# Patient Record
Sex: FEMALE | Race: WHITE | NOT HISPANIC OR LATINO | URBAN - METROPOLITAN AREA
[De-identification: names, ages, dates, MRNs, and addresses within clinical notes are randomized per-mention and may not be internally consistent; named-entity substitution may affect disease eponyms.]

---

## 2017-10-26 ENCOUNTER — APPOINTMENT (OUTPATIENT)
Dept: WOMENS IMAGING | Facility: HOSPITAL | Age: 52
End: 2017-10-26

## 2017-10-26 PROCEDURE — 77067 SCR MAMMO BI INCL CAD: CPT | Performed by: RADIOLOGY

## 2017-10-26 PROCEDURE — 77063 BREAST TOMOSYNTHESIS BI: CPT | Performed by: RADIOLOGY

## 2018-01-19 ENCOUNTER — HOSPITAL ENCOUNTER (OUTPATIENT)
Facility: HOSPITAL | Age: 53
Setting detail: HOSPITAL OUTPATIENT SURGERY
End: 2018-01-19
Attending: OBSTETRICS & GYNECOLOGY | Admitting: OBSTETRICS & GYNECOLOGY

## 2018-02-02 ENCOUNTER — APPOINTMENT (OUTPATIENT)
Dept: PREADMISSION TESTING | Facility: HOSPITAL | Age: 53
End: 2018-02-02

## 2019-01-21 ENCOUNTER — APPOINTMENT (OUTPATIENT)
Dept: WOMENS IMAGING | Facility: HOSPITAL | Age: 54
End: 2019-01-21

## 2019-01-21 PROCEDURE — 77067 SCR MAMMO BI INCL CAD: CPT | Performed by: RADIOLOGY

## 2019-01-21 PROCEDURE — 77063 BREAST TOMOSYNTHESIS BI: CPT | Performed by: RADIOLOGY

## 2019-08-14 ENCOUNTER — OFFICE VISIT (OUTPATIENT)
Dept: GASTROENTEROLOGY | Facility: CLINIC | Age: 54
End: 2019-08-14

## 2019-08-14 VITALS
WEIGHT: 149.2 LBS | SYSTOLIC BLOOD PRESSURE: 128 MMHG | DIASTOLIC BLOOD PRESSURE: 72 MMHG | HEIGHT: 62 IN | TEMPERATURE: 98.3 F | BODY MASS INDEX: 27.46 KG/M2

## 2019-08-14 DIAGNOSIS — R10.13 EPIGASTRIC PAIN: Primary | ICD-10-CM

## 2019-08-14 DIAGNOSIS — Z80.0 FH: COLON CANCER: ICD-10-CM

## 2019-08-14 PROCEDURE — 99203 OFFICE O/P NEW LOW 30 MIN: CPT | Performed by: INTERNAL MEDICINE

## 2019-08-14 NOTE — PROGRESS NOTES
Chief Complaint   Patient presents with   • FM HX colon cancer     Itzel Moses is a 53 y.o. female who presents with some vague discomfort in the right side that radiates to the back.  Intermittent discomfort - starts after eating. No nausea.  Weight has increased.  She takes probiotic and prebiotic. She thinks the probiotic helps.  Negative celiac testing 1 year ago.   Last c/s 4-5 years ago in [place] - no polyps.    Since her last visit her father has been diagnosed with CRC.  Mother had CRC with liver mets.      HPI  Past Medical History:   Diagnosis Date   • Allergic rhinitis    • Depression    • Family hx of colon cancer      Past Surgical History:   Procedure Laterality Date   • BREAST SURGERY     • COLONOSCOPY  approx 2015    normal per patient   • KNEE SURGERY     • OVARIAN CYST REMOVAL         Current Outpatient Medications:   •  cetirizine (zyrTEC) 10 MG tablet, Take 10 mg by mouth Daily., Disp: , Rfl:   •  Collagen 500 MG capsule, Take  by mouth., Disp: , Rfl:   •  PREBIOTIC PRODUCT PO, Take  by mouth., Disp: , Rfl:   •  Probiotic Product (PROBIOTIC PO), Take  by mouth., Disp: , Rfl:   •  sertraline (ZOLOFT) 50 MG tablet, Take 50 mg by mouth daily., Disp: , Rfl:   •  TURMERIC PO, Take  by mouth., Disp: , Rfl:   No Known Allergies  Social History     Socioeconomic History   • Marital status:      Spouse name: Not on file   • Number of children: Not on file   • Years of education: Not on file   • Highest education level: Not on file   Tobacco Use   • Smoking status: Former Smoker     Types: Cigarettes     Last attempt to quit: 2009     Years since quitting: 10.6   • Smokeless tobacco: Never Used   • Tobacco comment: social   Substance and Sexual Activity   • Alcohol use: Yes   • Drug use: No   • Sexual activity: Defer     Family History   Problem Relation Age of Onset   • Liver cancer Mother    • Colon cancer Mother    • Prostate cancer Father    • Colon cancer Father    • Heart attack Father     • Melanoma Father      Review of Systems   Constitutional: Negative for appetite change and unexpected weight change.   Gastrointestinal: Positive for abdominal pain. Negative for blood in stool, constipation, diarrhea and nausea.   Musculoskeletal: Positive for back pain.   All other systems reviewed and are negative.    Vitals:    08/14/19 1312   BP: 128/72   Temp: 98.3 °F (36.8 °C)         08/14/19  1312   Weight: 67.7 kg (149 lb 3.2 oz)     Physical Exam   Constitutional: She appears well-developed and well-nourished.   HENT:   Head: Normocephalic and atraumatic.   Eyes: No scleral icterus.   Abdominal: Soft. She exhibits no distension and no mass. There is tenderness.       Neurological: She is alert.   Skin: Skin is warm and dry.   Psychiatric: She has a normal mood and affect.     No images are attached to the encounter.  No notes on file  Itzel was seen today for fm hx colon cancer.    Diagnoses and all orders for this visit:    Epigastric pain  -     Case Request; Standing  -     Case Request    FH: colon cancer  -     Case Request; Standing  -     Case Request    Other orders  -     Follow Anesthesia Guidelines / Standing Orders; Future  -     Obtain Informed Consent; Future  -     Implement Anesthesia orders day of procedure.; Standing  -     Obtain informed consent; Standing  -     Verify bowel prep was successful; Standing  -     Give tap water enema if bowel prep was insufficient; Standing    Plan:  - she is due for colonoscopy - given symptoms, would recommend egd to be performed with c/s for further eval  - d/c prebiotic - ok to continue probiotic as she feels like this has helped

## 2019-10-25 ENCOUNTER — OUTSIDE FACILITY SERVICE (OUTPATIENT)
Dept: GASTROENTEROLOGY | Facility: CLINIC | Age: 54
End: 2019-10-25

## 2019-10-25 PROCEDURE — 45380 COLONOSCOPY AND BIOPSY: CPT | Performed by: INTERNAL MEDICINE

## 2019-10-25 PROCEDURE — 43239 EGD BIOPSY SINGLE/MULTIPLE: CPT | Performed by: INTERNAL MEDICINE

## 2019-11-05 ENCOUNTER — TELEPHONE (OUTPATIENT)
Dept: GASTROENTEROLOGY | Facility: CLINIC | Age: 54
End: 2019-11-05

## 2019-11-05 NOTE — TELEPHONE ENCOUNTER
Mild inflammation seen on gastric and duodenal bx.  Otherwise all biopsies normal.  rec omeprazole 20 mg bid x 6 weeks, avoid nsaids.    Repeat c/s 5 years    Office f/u with NP 6 weeks

## 2019-11-07 NOTE — TELEPHONE ENCOUNTER
Called pt and advised per Dr Duque that she had mild inflammation seen on gastric and duodenal bx .  Otherwise all bx normal.  She recommends omeprazole 20mg po bid for 6 wks , avoid nsaids.      Also repeat c/s in 5 yrs and f/u with NP in 6 wks. Pt verb understanding and made appt for 01/06 at 11a with Katerin ACEVES

## 2020-01-06 ENCOUNTER — TELEPHONE (OUTPATIENT)
Dept: GASTROENTEROLOGY | Facility: CLINIC | Age: 55
End: 2020-01-06

## 2020-01-06 ENCOUNTER — OFFICE VISIT (OUTPATIENT)
Dept: GASTROENTEROLOGY | Facility: CLINIC | Age: 55
End: 2020-01-06

## 2020-01-06 VITALS
WEIGHT: 145 LBS | TEMPERATURE: 98 F | BODY MASS INDEX: 26.68 KG/M2 | DIASTOLIC BLOOD PRESSURE: 76 MMHG | SYSTOLIC BLOOD PRESSURE: 112 MMHG | HEIGHT: 62 IN

## 2020-01-06 DIAGNOSIS — K29.80 DUODENITIS: ICD-10-CM

## 2020-01-06 DIAGNOSIS — K21.00 GASTROESOPHAGEAL REFLUX DISEASE WITH ESOPHAGITIS: ICD-10-CM

## 2020-01-06 DIAGNOSIS — K29.60 EROSIVE GASTRITIS: Primary | ICD-10-CM

## 2020-01-06 DIAGNOSIS — Z80.0 FH: COLON CANCER: ICD-10-CM

## 2020-01-06 PROCEDURE — 99214 OFFICE O/P EST MOD 30 MIN: CPT | Performed by: NURSE PRACTITIONER

## 2020-01-06 RX ORDER — TOPIRAMATE 25 MG/1
75 TABLET ORAL DAILY
COMMUNITY
Start: 2019-12-03

## 2020-01-06 RX ORDER — OMEPRAZOLE 20 MG/1
20 TABLET, DELAYED RELEASE ORAL 2 TIMES DAILY
Qty: 60 TABLET | Refills: 3 | Status: SHIPPED | OUTPATIENT
Start: 2020-01-06 | End: 2020-05-04

## 2020-01-06 RX ORDER — CHLORAL HYDRATE 500 MG
1000 CAPSULE ORAL
COMMUNITY

## 2020-01-06 NOTE — TELEPHONE ENCOUNTER
----- Message from Denise Russo sent at 1/6/2020  2:26 PM EST -----  Regarding: Meds   Contact: [phone]  Pt is calling regarding insurance is not covering Omeprazole . Pt is asking to change it to Pantropazole

## 2020-01-06 NOTE — TELEPHONE ENCOUNTER
Called pt and pt states her pharmacy advised her that her insurance will not cover for omeprazole 20mg bid but will cover for omeprazole 40mg daily.  She also state she thinks that her insurance will cover for pantoprazole bid.  Advised pt will call her pharmacy and see what they say.  Pt verb understanding.     Called [facility] and spoke with pharmacist Gwen who advised they will cover for once a day dosing either omeprazole 20mg once a day or 40mg once a day.  Also will cover pantoprazole once a day. Advised will send message to Katerin ACEVES.  She verb understanding.

## 2020-01-06 NOTE — PROGRESS NOTES
Chief Complaint   Patient presents with   • Follow-up     post scopes   • Abdominal Pain       Itzel Moses is a  54 y.o. female here for a follow up visit for GERD.    HPI  54-year-old female presents today for follow-up visit for GERD with upper abdominal pain.  She is a patient of Dr. Duque.  She recently underwent EGD and colonoscopy on 10/25/2019.  EGD showed erosive esophagitis with gastritis and duodenitis.  Colonoscopy showed diverticulosis with some nonbleeding internal hemorrhoids.  Path was negative.  She was supposed to take omeprazole 20 mg twice daily for 6 weeks and then taper off.  She tells me she does not like taking pills and she definitely does not want take a PPI because she is read all potential side effects.  And so she did not take anything.  She does me she does drink a lot of alcohol and caffeine.  She is recently switched from wine to vodka due to the wine causing her migraines to be worse.  She tells me she is under a lot of stress with caregiving for her elderly father in her home.  Both her  and her son are on reflux medicine and she hates that they are both on it.  She tells me she has been trying to eat better and do more natural remedies like probiotics.  She tells me she is never really had any reflux symptoms other than the upper abdominal pain.  She does have a family history of colon cancer.  She is in recall for another colonoscopy in 5 years.  She denies any dysphagia, reflux, nausea and vomiting, diarrhea, constipation, rectal bleeding or melena.  She was appetite is good and her weight is stable.  Past Medical History:   Diagnosis Date   • Allergic rhinitis    • Depression    • Family hx of colon cancer    • Migraines        Past Surgical History:   Procedure Laterality Date   • BREAST SURGERY     • COLONOSCOPY  approx 2015    normal per patient   • COLONOSCOPY  10/25/2019    diverticulosis, NBIH, atrophic and granular mucosa in terminal ileum   • ENDOSCOPY   10/25/2019    erosive esophagitis, duodenitis   • KNEE SURGERY     • OVARIAN CYST REMOVAL         Scheduled Meds:    Continuous Infusions:  No current facility-administered medications for this visit.     PRN Meds:.    No Known Allergies    Social History     Socioeconomic History   • Marital status:      Spouse name: Not on file   • Number of children: Not on file   • Years of education: Not on file   • Highest education level: Not on file   Tobacco Use   • Smoking status: Former Smoker     Types: Cigarettes     Last attempt to quit: 2009     Years since quittin.0   • Smokeless tobacco: Never Used   • Tobacco comment: social   Substance and Sexual Activity   • Alcohol use: Yes   • Drug use: No   • Sexual activity: Defer       Family History   Problem Relation Age of Onset   • Liver cancer Mother    • Colon cancer Mother    • Prostate cancer Father    • Colon cancer Father    • Heart attack Father    • Melanoma Father        Review of Systems   Constitutional: Negative for appetite change, chills, diaphoresis, fatigue, fever and unexpected weight change.   HENT: Negative for nosebleeds, postnasal drip, sore throat, trouble swallowing and voice change.    Respiratory: Negative for cough, choking, chest tightness, shortness of breath and wheezing.    Cardiovascular: Negative for chest pain, palpitations and leg swelling.   Gastrointestinal: Positive for abdominal distention. Negative for abdominal pain, anal bleeding, blood in stool, constipation, diarrhea, nausea, rectal pain and vomiting.   Endocrine: Negative for polydipsia, polyphagia and polyuria.   Musculoskeletal: Negative for gait problem.   Skin: Negative for rash and wound.   Allergic/Immunologic: Negative for food allergies.   Neurological: Negative for dizziness, speech difficulty and light-headedness.   Psychiatric/Behavioral: Negative for confusion, self-injury, sleep disturbance and suicidal ideas.       Vitals:    20 1059   BP: 112/76    Temp: 98 °F (36.7 °C)       Physical Exam   Constitutional: She is oriented to person, place, and time. She appears well-developed and well-nourished. She does not appear ill. No distress.   HENT:   Head: Normocephalic.   Eyes: Pupils are equal, round, and reactive to light.   Cardiovascular: Normal rate, regular rhythm and normal heart sounds.   Pulmonary/Chest: Effort normal and breath sounds normal.   Abdominal: Soft. Bowel sounds are normal. She exhibits distension. She exhibits no mass. There is no hepatosplenomegaly. There is no tenderness. There is no rebound and no guarding. No hernia.   Musculoskeletal: Normal range of motion.   Neurological: She is alert and oriented to person, place, and time.   Skin: Skin is warm and dry.   Psychiatric: She has a normal mood and affect. Her speech is normal and behavior is normal. Judgment normal.       No images are attached to the encounter.     1. Erosive gastritis    2. Gastroesophageal reflux disease with esophagitis    3. Duodenitis    4. FH: colon cancer    Reviewed scope results with her today.  She never did take the omeprazole as directed after her EGD.  Had a long discussion with her today about the risk of esophageal cancer and letting reflux go untreated.  She is agreed to get on omeprazole 20 mg twice daily x6 weeks.  At that time we can transfer her over hopefully to Pepcid or some other H2 blocker.  Long discussion with her about GERD triggers.  She is going to work on her alcohol intake as well as her caffeine use.  Patient is under a lot of stress at home with caretaking for her elderly father.  Patient to call the office with any issues.  Patient to continue GERD precautions.  Patient to follow-up with me or Dr. Duque in 3 months.  Recall colonoscopy in 5 years.

## 2020-01-07 RX ORDER — PANTOPRAZOLE SODIUM 40 MG/1
40 TABLET, DELAYED RELEASE ORAL DAILY
Qty: 30 TABLET | Refills: 3 | Status: SHIPPED | OUTPATIENT
Start: 2020-01-07 | End: 2020-04-07

## 2020-01-07 NOTE — TELEPHONE ENCOUNTER
Called pt and advised per Katerin NP that she is going to switch her to pantoprazole 40mg po daily and we will see how she does on that.  It is stronger than omeprazole.  Advised we will escribe this to her [facility] pharmacy. Pt verb understanding.

## 2020-01-22 ENCOUNTER — APPOINTMENT (OUTPATIENT)
Dept: WOMENS IMAGING | Facility: HOSPITAL | Age: 55
End: 2020-01-22

## 2020-01-22 PROCEDURE — 77063 BREAST TOMOSYNTHESIS BI: CPT | Performed by: RADIOLOGY

## 2020-01-22 PROCEDURE — 77067 SCR MAMMO BI INCL CAD: CPT | Performed by: RADIOLOGY

## 2020-04-07 RX ORDER — PANTOPRAZOLE SODIUM 40 MG/1
TABLET, DELAYED RELEASE ORAL
Qty: 90 TABLET | Refills: 0 | Status: SHIPPED | OUTPATIENT
Start: 2020-04-07 | End: 2020-05-04

## 2020-05-04 ENCOUNTER — OFFICE VISIT (OUTPATIENT)
Dept: GASTROENTEROLOGY | Facility: CLINIC | Age: 55
End: 2020-05-04

## 2020-05-04 VITALS — BODY MASS INDEX: 24.84 KG/M2 | WEIGHT: 135 LBS | HEIGHT: 62 IN

## 2020-05-04 DIAGNOSIS — Z80.0 FH: COLON CANCER: ICD-10-CM

## 2020-05-04 DIAGNOSIS — K29.80 DUODENITIS: ICD-10-CM

## 2020-05-04 DIAGNOSIS — K29.60 EROSIVE GASTRITIS: Primary | ICD-10-CM

## 2020-05-04 DIAGNOSIS — K21.9 GASTROESOPHAGEAL REFLUX DISEASE, ESOPHAGITIS PRESENCE NOT SPECIFIED: ICD-10-CM

## 2020-05-04 PROCEDURE — 99442 PR PHYS/QHP TELEPHONE EVALUATION 11-20 MIN: CPT | Performed by: NURSE PRACTITIONER

## 2020-05-04 RX ORDER — ESTRADIOL 0.04 MG/D
FILM, EXTENDED RELEASE TRANSDERMAL
COMMUNITY
Start: 2020-04-22

## 2020-05-04 RX ORDER — TOPIRAMATE 50 MG/1
1 CAPSULE, EXTENDED RELEASE ORAL
COMMUNITY
Start: 2020-04-28

## 2020-05-04 NOTE — PROGRESS NOTES
Chief Complaint   Patient presents with   • Gastritis       Itzel Moses is a  54 y.o. female here for a telephone follow up visit for GERD.    HPI  54-year-old female presents today for telephone follow-up visit for GERD.  You have chosen to receive care through a telephone visit. Do you consent to use a telephone visit for your medical care today? YES.  She is a patient of Dr. Duque.  She was last seen in the office on 2020.  She has a history of erosive gastritis/GERD/duodenitis and admits she is done really well on Protonix 40 mg once daily.  However she does not want to take this long-term if possible.  She would like to try something else that is much safer.  She denies any dysphagia, reflux, abdominal pain, nausea vomiting, diarrhea, constipation, rectal bleeding or melena.  She much appetite is good and her weight is stable.  She does have a family history of colon cancer.  Her last colonoscopy and EGD were done on 10/25/2019.    Past Medical History:   Diagnosis Date   • Allergic rhinitis    • Depression    • Family hx of colon cancer    • Migraines        Past Surgical History:   Procedure Laterality Date   • BREAST SURGERY     • COLONOSCOPY  approx     normal per patient   • COLONOSCOPY  10/25/2019    diverticulosis, NBIH, atrophic and granular mucosa in terminal ileum   • ENDOSCOPY  10/25/2019    erosive esophagitis, duodenitis   • KNEE SURGERY     • OVARIAN CYST REMOVAL         Scheduled Meds:    Continuous Infusions:  No current facility-administered medications for this visit.     PRN Meds:.    No Known Allergies    Social History     Socioeconomic History   • Marital status:      Spouse name: Not on file   • Number of children: Not on file   • Years of education: Not on file   • Highest education level: Not on file   Tobacco Use   • Smoking status: Former Smoker     Types: Cigarettes     Last attempt to quit: 2009     Years since quittin.3   • Smokeless tobacco: Never  Used   • Tobacco comment: social   Substance and Sexual Activity   • Alcohol use: Yes   • Drug use: No   • Sexual activity: Defer       Family History   Problem Relation Age of Onset   • Liver cancer Mother    • Colon cancer Mother    • Prostate cancer Father    • Colon cancer Father    • Heart attack Father    • Melanoma Father        Review of Systems   Constitutional: Negative for appetite change, chills, diaphoresis, fatigue, fever and unexpected weight change.   HENT: Negative for nosebleeds, postnasal drip, sore throat, trouble swallowing and voice change.    Respiratory: Negative for cough, choking, chest tightness, shortness of breath and wheezing.    Cardiovascular: Negative for chest pain, palpitations and leg swelling.   Gastrointestinal: Negative for abdominal distention, abdominal pain, anal bleeding, blood in stool, constipation, diarrhea, nausea, rectal pain and vomiting.   Endocrine: Negative for polydipsia, polyphagia and polyuria.   Musculoskeletal: Negative for gait problem.   Skin: Negative for rash and wound.   Allergic/Immunologic: Negative for food allergies.   Neurological: Negative for dizziness, speech difficulty and light-headedness.   Psychiatric/Behavioral: Negative for confusion, self-injury, sleep disturbance and suicidal ideas.       There were no vitals filed for this visit.    Physical Exam   Constitutional: She is oriented to person, place, and time. No distress.   Neurological: She is alert and oriented to person, place, and time.   Psychiatric: She has a normal mood and affect. Her behavior is normal. Judgment and thought content normal.       No radiology results for the last 7 days     Assessment and plan     1. Erosive gastritis    2. Gastroesophageal reflux disease, esophagitis presence not specified    3. Duodenitis    4. FH: colon cancer    Today's visit was done over the telephone.  Total time over the phone with the patient was 15 minutes.  GERD/erosive gastritis seems  well controlled on Protonix 40 mg once daily.  Given its potential long-term side effects we can go ahead and trial the patient on Pepcid 20 mg daily and see how she does.  Continue GERD precautions.  Patient to follow-up with me in the office in 1 month.  Patient to call the office with any issues.  Patient is agreeable to the plan.

## 2020-06-10 ENCOUNTER — TELEPHONE (OUTPATIENT)
Dept: GASTROENTEROLOGY | Facility: CLINIC | Age: 55
End: 2020-06-10

## 2020-06-10 RX ORDER — PANTOPRAZOLE SODIUM 40 MG/1
40 TABLET, DELAYED RELEASE ORAL DAILY
Qty: 30 TABLET | Refills: 3 | Status: SHIPPED | OUTPATIENT
Start: 2020-06-10 | End: 2020-09-17

## 2020-06-10 NOTE — TELEPHONE ENCOUNTER
----- Message from Pavel Caraballo sent at 6/10/2020  8:52 AM EDT -----  Regarding: Refill   Contact: [phone]  Patient stated during one of her last visits she had discussed coming off Omeprazole and Katerin had said it was her choice. She stated she is not ready to come off Omeprazole at this time and that she is getting low on medication and will need a refill.     #Patient had an office appointment today but cancelled due to recent traveling (via plane) and not feeling well this morning.

## 2020-06-10 NOTE — TELEPHONE ENCOUNTER
She definitely stay on it for now.  Thanks.  Katerin Manrique, MILLY.     **See o/v note of 5/4 - escribe completed for pantoprazole 40 mg 1 tab po daily, #30, R3.   VM to pt - advise of same.  Contact office if any questions.  Irena Bourgeois RN.

## 2020-06-24 ENCOUNTER — TELEPHONE (OUTPATIENT)
Dept: GASTROENTEROLOGY | Facility: CLINIC | Age: 55
End: 2020-06-24

## 2020-06-24 NOTE — TELEPHONE ENCOUNTER
----- Message from Salma Daniels sent at 6/23/2020  4:22 PM EDT -----  Contact: [phone]  Patient is asking for a return call.  Thanks!

## 2020-06-24 NOTE — TELEPHONE ENCOUNTER
Call to pt.  Asking if Dr Duque removes external hemorrhoids.  Advise this office does not - generally refer to DR Zoran Loya.  States GYN has referred her to DR Loya.

## 2020-07-20 PROBLEM — M20.41 HAMMER TOE OF RIGHT FOOT: Status: ACTIVE | Noted: 2018-06-11

## 2020-07-21 ENCOUNTER — OFFICE VISIT (OUTPATIENT)
Dept: SURGERY | Facility: CLINIC | Age: 55
End: 2020-07-21

## 2020-07-21 VITALS
HEIGHT: 62 IN | SYSTOLIC BLOOD PRESSURE: 130 MMHG | WEIGHT: 138.3 LBS | BODY MASS INDEX: 25.45 KG/M2 | OXYGEN SATURATION: 98 % | DIASTOLIC BLOOD PRESSURE: 86 MMHG | TEMPERATURE: 98.2 F | HEART RATE: 65 BPM

## 2020-07-21 DIAGNOSIS — K64.5 THROMBOSED HEMORRHOIDS: ICD-10-CM

## 2020-07-21 DIAGNOSIS — K64.4 ANAL SKIN TAG: Primary | ICD-10-CM

## 2020-07-21 PROCEDURE — 99204 OFFICE O/P NEW MOD 45 MIN: CPT | Performed by: COLON & RECTAL SURGERY

## 2020-09-17 RX ORDER — PANTOPRAZOLE SODIUM 40 MG/1
TABLET, DELAYED RELEASE ORAL
Qty: 90 TABLET | Refills: 1 | Status: SHIPPED | OUTPATIENT
Start: 2020-09-17 | End: 2021-03-23

## 2021-01-28 ENCOUNTER — APPOINTMENT (OUTPATIENT)
Dept: WOMENS IMAGING | Facility: HOSPITAL | Age: 56
End: 2021-01-28

## 2021-01-28 PROCEDURE — 77063 BREAST TOMOSYNTHESIS BI: CPT | Performed by: RADIOLOGY

## 2021-01-28 PROCEDURE — 77067 SCR MAMMO BI INCL CAD: CPT | Performed by: RADIOLOGY

## 2021-03-23 RX ORDER — PANTOPRAZOLE SODIUM 40 MG/1
TABLET, DELAYED RELEASE ORAL
Qty: 90 TABLET | Refills: 1 | Status: SHIPPED | OUTPATIENT
Start: 2021-03-23 | End: 2021-10-11 | Stop reason: SDUPTHER

## 2021-07-16 NOTE — TELEPHONE ENCOUNTER
Message sent to Katerin NP regarding refilling of pantoprazole.    full range of motion in all extremities

## 2021-09-27 RX ORDER — PANTOPRAZOLE SODIUM 40 MG/1
TABLET, DELAYED RELEASE ORAL
Qty: 90 TABLET | Refills: 1 | OUTPATIENT
Start: 2021-09-27

## 2021-10-11 ENCOUNTER — TELEPHONE (OUTPATIENT)
Dept: GASTROENTEROLOGY | Facility: CLINIC | Age: 56
End: 2021-10-11

## 2021-10-11 ENCOUNTER — OFFICE VISIT (OUTPATIENT)
Dept: GASTROENTEROLOGY | Facility: CLINIC | Age: 56
End: 2021-10-11

## 2021-10-11 VITALS — TEMPERATURE: 96.6 F | BODY MASS INDEX: 26.31 KG/M2 | WEIGHT: 143 LBS | HEIGHT: 62 IN

## 2021-10-11 DIAGNOSIS — K29.80 DUODENITIS: ICD-10-CM

## 2021-10-11 DIAGNOSIS — R10.10 PAIN OF UPPER ABDOMEN: ICD-10-CM

## 2021-10-11 DIAGNOSIS — R19.7 DIARRHEA, UNSPECIFIED TYPE: ICD-10-CM

## 2021-10-11 DIAGNOSIS — K92.1 BLACK STOOL: Primary | ICD-10-CM

## 2021-10-11 DIAGNOSIS — K29.60 EROSIVE GASTRITIS: ICD-10-CM

## 2021-10-11 LAB
ALBUMIN SERPL-MCNC: 4.7 G/DL (ref 3.5–5.2)
ALBUMIN/GLOB SERPL: 2.2 G/DL
ALP SERPL-CCNC: 46 U/L (ref 39–117)
ALT SERPL-CCNC: 17 U/L (ref 1–33)
AMYLASE SERPL-CCNC: 71 U/L (ref 28–100)
AST SERPL-CCNC: 16 U/L (ref 1–32)
BASOPHILS # BLD AUTO: 0.05 10*3/MM3 (ref 0–0.2)
BASOPHILS NFR BLD AUTO: 1.1 % (ref 0–1.5)
BILIRUB SERPL-MCNC: 0.4 MG/DL (ref 0–1.2)
BUN SERPL-MCNC: 13 MG/DL (ref 6–20)
BUN/CREAT SERPL: 13.5 (ref 7–25)
CALCIUM SERPL-MCNC: 9.4 MG/DL (ref 8.6–10.5)
CHLORIDE SERPL-SCNC: 107 MMOL/L (ref 98–107)
CO2 SERPL-SCNC: 21.9 MMOL/L (ref 22–29)
CREAT SERPL-MCNC: 0.96 MG/DL (ref 0.57–1)
EOSINOPHIL # BLD AUTO: 0.16 10*3/MM3 (ref 0–0.4)
EOSINOPHIL NFR BLD AUTO: 3.6 % (ref 0.3–6.2)
ERYTHROCYTE [DISTWIDTH] IN BLOOD BY AUTOMATED COUNT: 11.9 % (ref 12.3–15.4)
GLOBULIN SER CALC-MCNC: 2.1 GM/DL
GLUCOSE SERPL-MCNC: 84 MG/DL (ref 65–99)
HCT VFR BLD AUTO: 39.8 % (ref 34–46.6)
HGB BLD-MCNC: 13.2 G/DL (ref 12–15.9)
IMM GRANULOCYTES # BLD AUTO: 0 10*3/MM3 (ref 0–0.05)
IMM GRANULOCYTES NFR BLD AUTO: 0 % (ref 0–0.5)
LIPASE SERPL-CCNC: 44 U/L (ref 13–60)
LYMPHOCYTES # BLD AUTO: 1.47 10*3/MM3 (ref 0.7–3.1)
LYMPHOCYTES NFR BLD AUTO: 32.8 % (ref 19.6–45.3)
MCH RBC QN AUTO: 33.2 PG (ref 26.6–33)
MCHC RBC AUTO-ENTMCNC: 33.2 G/DL (ref 31.5–35.7)
MCV RBC AUTO: 100 FL (ref 79–97)
MONOCYTES # BLD AUTO: 0.29 10*3/MM3 (ref 0.1–0.9)
MONOCYTES NFR BLD AUTO: 6.5 % (ref 5–12)
NEUTROPHILS # BLD AUTO: 2.51 10*3/MM3 (ref 1.7–7)
NEUTROPHILS NFR BLD AUTO: 56 % (ref 42.7–76)
NRBC BLD AUTO-RTO: 0 /100 WBC (ref 0–0.2)
PLATELET # BLD AUTO: 191 10*3/MM3 (ref 140–450)
POTASSIUM SERPL-SCNC: 4.7 MMOL/L (ref 3.5–5.2)
PROT SERPL-MCNC: 6.8 G/DL (ref 6–8.5)
RBC # BLD AUTO: 3.98 10*6/MM3 (ref 3.77–5.28)
SODIUM SERPL-SCNC: 144 MMOL/L (ref 136–145)
WBC # BLD AUTO: 4.48 10*3/MM3 (ref 3.4–10.8)

## 2021-10-11 PROCEDURE — 99214 OFFICE O/P EST MOD 30 MIN: CPT | Performed by: NURSE PRACTITIONER

## 2021-10-11 RX ORDER — SUCRALFATE 1 G/1
1 TABLET ORAL
Qty: 60 TABLET | Refills: 3 | Status: SHIPPED | OUTPATIENT
Start: 2021-10-11 | End: 2021-11-10

## 2021-10-11 RX ORDER — PANTOPRAZOLE SODIUM 40 MG/1
40 TABLET, DELAYED RELEASE ORAL
Qty: 180 TABLET | Refills: 1 | Status: SHIPPED | OUTPATIENT
Start: 2021-10-11 | End: 2022-04-06 | Stop reason: SDUPTHER

## 2021-10-11 NOTE — PROGRESS NOTES
Chief Complaint   Patient presents with   • Diarrhea   • Abdominal Pain   • Nausea   • Black or Bloody Stool   • Bloated   • Gas       Itzel Moses is a  55 y.o. female here for a follow up visit for diarrhea.    HPI  55-year-old female presents today for follow-up visit for diarrhea.  She is a patient of Dr. Duque.  She was last seen on telehealth visit by me on 5/4/2020.  She has a history of GERD/erosive gastritis/duodenitis and admits lately she has been having a lot of issues with upper abdominal pain, gas, bloating and fecal urgency.  She is also been having some episodes of black stools and diarrhea.  Normally she does well on Protonix 40 mg daily but admits lately just has not been working.  She tells me she has been under a lot of stress and is worried maybe she has developed an ulcer or something.  She does have a family history of colon cancer.  Her last EGD and colonoscopy was on 10/25/2019.  She denies any dysphagia, nausea and vomiting, constipation or rectal bleeding.  She admits her appetite is okay and her weight is stable.  Past Medical History:   Diagnosis Date   • Allergic conjunctivitis of right eye 06/25/2016    SEEN AT [facility]   • Allergic rhinitis    • Anxiety    • Asthma    • CMC arthritis 12/2012   • Depression    • Duodenitis 05/2020   • Dyspepsia    • Environmental allergies    • Erosive gastritis 05/2020   • GERD (gastroesophageal reflux disease)    • Hammertoe of right foot 06/2018   • Hemorrhoids    • Hiatal hernia    • Migraines    • Seasonal allergies        Past Surgical History:   Procedure Laterality Date   • BLEPHAROPLASTY Bilateral    • BREAST LUMPECTOMY  2005   • COLONOSCOPY N/A 2015    normal per patient, NO RECORDS, DONE IN [place]   • COLONOSCOPY N/A 10/25/2019    A FEW SMALL DIVERTICULA IN SIGMOID, GRADE 2 HEMORRHOIDS, A PATCHY AREA OF ATROPHIC AND GRANULAR MUCOSA IN TERMINAL ILEUM, RESCOPE IN 5 YRS, DR. MERRILL DUQUE AT [facility]   • ENDOSCOPY N/A  10/25/2019    EROSIVE GASTRITIS, DUODENITIS, DR. MERRILL BURKETT AT [facility]   • KNEE SURGERY     • OVARIAN CYST REMOVAL         Scheduled Meds:    Continuous Infusions:No current facility-administered medications for this visit.      PRN Meds:.    No Known Allergies    Social History     Socioeconomic History   • Marital status:    Tobacco Use   • Smoking status: Former Smoker     Types: Cigarettes     Quit date:      Years since quittin.8   • Smokeless tobacco: Never Used   • Tobacco comment: social   Substance and Sexual Activity   • Alcohol use: Yes     Comment: MODERATE AMT   • Drug use: No   • Sexual activity: Defer     Birth control/protection: Post-menopausal       Family History   Problem Relation Age of Onset   • Liver cancer Mother    • Colon cancer Mother    • Cancer Mother    • Colon polyps Mother    • Prostate cancer Father    • Colon cancer Father    • Heart attack Father    • Melanoma Father    • Cancer Father    • Colon polyps Father    • Heart disease Father    • Cancer Sister        Review of Systems   Constitutional: Negative for appetite change, chills, diaphoresis, fatigue, fever and unexpected weight change.   HENT: Negative for nosebleeds, postnasal drip, sore throat, trouble swallowing and voice change.    Respiratory: Negative for cough, choking, chest tightness, shortness of breath, wheezing and stridor.    Cardiovascular: Negative for chest pain, palpitations and leg swelling.   Gastrointestinal: Positive for abdominal distention, abdominal pain, blood in stool and diarrhea. Negative for anal bleeding, constipation, nausea, rectal pain and vomiting.   Endocrine: Negative for polydipsia, polyphagia and polyuria.   Musculoskeletal: Negative for gait problem.   Skin: Negative for rash and wound.   Allergic/Immunologic: Negative for food allergies.   Neurological: Negative for dizziness, speech difficulty and light-headedness.   Psychiatric/Behavioral:  Negative for confusion, self-injury, sleep disturbance and suicidal ideas.       Vitals:    10/11/21 0919   Temp: 96.6 °F (35.9 °C)       Physical Exam  Constitutional:       General: She is not in acute distress.     Appearance: She is well-developed. She is not ill-appearing.   HENT:      Head: Normocephalic.   Eyes:      Pupils: Pupils are equal, round, and reactive to light.   Cardiovascular:      Rate and Rhythm: Normal rate and regular rhythm.      Heart sounds: Normal heart sounds.   Pulmonary:      Effort: Pulmonary effort is normal.      Breath sounds: Normal breath sounds.   Abdominal:      General: Bowel sounds are normal. There is distension.      Palpations: Abdomen is soft. There is no mass.      Tenderness: There is abdominal tenderness. There is no guarding or rebound.      Hernia: No hernia is present.       Musculoskeletal:         General: Normal range of motion.   Skin:     General: Skin is warm and dry.   Neurological:      Mental Status: She is alert and oriented to person, place, and time.   Psychiatric:         Speech: Speech normal.         Behavior: Behavior normal.         Judgment: Judgment normal.         No radiology results for the last 7 days     Diagnoses and all orders for this visit:    1. Black stool (Primary)  -     Case Request; Standing  -     Case Request  -     CBC & Differential  -     Comprehensive Metabolic Panel  -     Amylase  -     Lipase  -     Gastrointestinal Panel, PCR - Stool, Per Rectum  -     Clostridium Difficile Toxin, PCR - Stool, Per Rectum  -     Fecal Lactoferrin - Stool, Per Rectum    2. Pain of upper abdomen  -     Case Request; Standing  -     Case Request  -     CBC & Differential  -     Comprehensive Metabolic Panel  -     Amylase  -     Lipase    3. Erosive gastritis  -     Case Request; Standing  -     Case Request    4. Duodenitis  -     Case Request; Standing  -     Case Request    5. Diarrhea, unspecified type  -     CBC & Differential  -      Comprehensive Metabolic Panel  -     Amylase  -     Lipase  -     Gastrointestinal Panel, PCR - Stool, Per Rectum  -     Clostridium Difficile Toxin, PCR - Stool, Per Rectum  -     Fecal Lactoferrin - Stool, Per Rectum    Other orders  -     Follow Anesthesia Guidelines / Protocol; Future  -     Obtain Informed Consent; Future  -     pantoprazole (PROTONIX) 40 MG EC tablet; Take 1 tablet by mouth 2 (Two) Times a Day Before Meals.  Dispense: 180 tablet; Refill: 1  -     sucralfate (Carafate) 1 g tablet; Take 1 tablet by mouth 2 (Two) Times a Day Before Meals. Dissolve in water and drink as a slurry  Dispense: 60 tablet; Refill: 3      Given her history and current symptoms recommend we check labs and stool studies today.  We will also get her on the schedule for an EGD with Dr. Duque for further evaluation.  Patient is agreeable to the scope.  I do wonder if she has worsening erosive gastritis versus ulcers.  We will increase her Protonix to 40 mg twice daily and add some Carafate.  I am sure most of this was stress-induced given everything she has been under lately.  I would like her to trial some Pepto-Bismol over-the-counter for her abdominal pain and diarrhea.  Recommend a bland diet.  Patient to call the office next week with an update.  Patient to follow-up with me in 3 to 4 weeks.  Patient is agreeable to the plan.

## 2021-10-11 NOTE — TELEPHONE ENCOUNTER
spoke with pt in office scheduled at [facility] on nov 1 arrive at 230 pm anushka carvalho

## 2021-10-12 ENCOUNTER — TELEPHONE (OUTPATIENT)
Dept: GASTROENTEROLOGY | Facility: CLINIC | Age: 56
End: 2021-10-12

## 2021-10-12 NOTE — TELEPHONE ENCOUNTER
----- Message from MILLY Ruiz sent at 10/12/2021  9:16 AM EDT -----  Please call the patient and let her know her white count was normal and her hemoglobin looks good.  Awaiting stool studies.

## 2021-10-14 LAB — C DIFF TOX GENS STL QL NAA+PROBE: NEGATIVE

## 2021-10-18 LAB — LACTOFERRIN STL-MCNC: <1 UG/ML(G) (ref 0–7.24)

## 2021-10-19 ENCOUNTER — TELEPHONE (OUTPATIENT)
Dept: GASTROENTEROLOGY | Facility: CLINIC | Age: 56
End: 2021-10-19

## 2021-10-19 LAB
ADV 40+41 DNA STL QL NAA+NON-PROBE: NOT DETECTED
ASTRO TYP 1-8 RNA STL QL NAA+NON-PROBE: NOT DETECTED
C CAYETANENSIS DNA STL QL NAA+NON-PROBE: NOT DETECTED
C COLI+JEJ+UPSA DNA STL QL NAA+NON-PROBE: NOT DETECTED
C DIF TOX TCDA+TCDB STL QL NAA+NON-PROBE: NOT DETECTED
CRYPTOSP DNA STL QL NAA+NON-PROBE: NOT DETECTED
E COLI O157 DNA STL QL NAA+NON-PROBE: NORMAL
E HISTOLYT DNA STL QL NAA+NON-PROBE: NOT DETECTED
EAEC PAA PLAS AGGR+AATA ST NAA+NON-PRB: NOT DETECTED
EC STX1+STX2 GENES STL QL NAA+NON-PROBE: NOT DETECTED
EPEC EAE GENE STL QL NAA+NON-PROBE: NOT DETECTED
ETEC LTA+ST1A+ST1B TOX ST NAA+NON-PROBE: NOT DETECTED
G LAMBLIA DNA STL QL NAA+NON-PROBE: NOT DETECTED
NOROVIRUS GI+II RNA STL QL NAA+NON-PROBE: NOT DETECTED
P SHIGELLOIDES DNA STL QL NAA+NON-PROBE: NOT DETECTED
RVA RNA STL QL NAA+NON-PROBE: NOT DETECTED
S ENT+BONG DNA STL QL NAA+NON-PROBE: NOT DETECTED
SAPO I+II+IV+V RNA STL QL NAA+NON-PROBE: NOT DETECTED
SHIGELLA SP+EIEC IPAH ST NAA+NON-PROBE: NOT DETECTED
V CHOL+PARA+VUL DNA STL QL NAA+NON-PROBE: NOT DETECTED
V CHOLERAE DNA STL QL NAA+NON-PROBE: NOT DETECTED
Y ENTEROCOL DNA STL QL NAA+NON-PROBE: NOT DETECTED

## 2021-10-19 NOTE — TELEPHONE ENCOUNTER
Called pt and advised of Katerin's note. Verb understanding.     Pt reports that she is actually better today, but still uncomfortable after eating. Pt feels like it is getting better. . Pt feels like it may be her lower stomach. Pt reports that she just started the carafate today due to forgetting to pick it up. Advised will send message to Katerin ACEVES.

## 2021-10-19 NOTE — TELEPHONE ENCOUNTER
----- Message from MILLY Ruiz sent at 10/19/2021 10:47 AM EDT -----  Please call the patient and let her know her GI PCR and her fecal lactoferrin were normal.  How is she doing?

## 2021-10-26 ENCOUNTER — TRANSCRIBE ORDERS (OUTPATIENT)
Dept: GASTROENTEROLOGY | Facility: CLINIC | Age: 56
End: 2021-10-26

## 2021-10-26 DIAGNOSIS — Z01.818 OTHER SPECIFIED PRE-OPERATIVE EXAMINATION: Primary | ICD-10-CM

## 2021-10-27 ENCOUNTER — TRANSCRIBE ORDERS (OUTPATIENT)
Dept: SLEEP MEDICINE | Facility: HOSPITAL | Age: 56
End: 2021-10-27

## 2021-10-29 ENCOUNTER — LAB (OUTPATIENT)
Dept: LAB | Facility: HOSPITAL | Age: 56
End: 2021-10-29

## 2021-10-29 DIAGNOSIS — Z01.818 OTHER SPECIFIED PRE-OPERATIVE EXAMINATION: Primary | ICD-10-CM

## 2021-10-29 LAB — SARS-COV-2 ORF1AB RESP QL NAA+PROBE: NOT DETECTED

## 2021-10-29 PROCEDURE — U0004 COV-19 TEST NON-CDC HGH THRU: HCPCS

## 2021-10-29 PROCEDURE — C9803 HOPD COVID-19 SPEC COLLECT: HCPCS

## 2021-11-01 ENCOUNTER — HOSPITAL ENCOUNTER (OUTPATIENT)
Facility: HOSPITAL | Age: 56
Setting detail: HOSPITAL OUTPATIENT SURGERY
Discharge: HOME | End: 2021-11-01
Attending: INTERNAL MEDICINE | Admitting: INTERNAL MEDICINE

## 2021-11-01 ENCOUNTER — ANESTHESIA (OUTPATIENT)
Dept: GASTROENTEROLOGY | Facility: HOSPITAL | Age: 56
End: 2021-11-01

## 2021-11-01 ENCOUNTER — ANESTHESIA EVENT (OUTPATIENT)
Dept: GASTROENTEROLOGY | Facility: HOSPITAL | Age: 56
End: 2021-11-01

## 2021-11-01 VITALS
HEART RATE: 59 BPM | TEMPERATURE: 98 F | BODY MASS INDEX: 25.4 KG/M2 | WEIGHT: 138 LBS | HEIGHT: 62 IN | SYSTOLIC BLOOD PRESSURE: 105 MMHG | DIASTOLIC BLOOD PRESSURE: 80 MMHG | RESPIRATION RATE: 19 BRPM | OXYGEN SATURATION: 95 %

## 2021-11-01 DIAGNOSIS — K29.80 DUODENITIS: ICD-10-CM

## 2021-11-01 DIAGNOSIS — K92.1 BLACK STOOL: ICD-10-CM

## 2021-11-01 DIAGNOSIS — R10.10 PAIN OF UPPER ABDOMEN: ICD-10-CM

## 2021-11-01 DIAGNOSIS — K29.60 EROSIVE GASTRITIS: ICD-10-CM

## 2021-11-01 PROCEDURE — 25010000002 ONDANSETRON PER 1 MG: Performed by: ANESTHESIOLOGY

## 2021-11-01 PROCEDURE — S0260 H&P FOR SURGERY: HCPCS | Performed by: INTERNAL MEDICINE

## 2021-11-01 PROCEDURE — 25010000002 PROPOFOL 10 MG/ML EMULSION: Performed by: ANESTHESIOLOGY

## 2021-11-01 PROCEDURE — 43239 EGD BIOPSY SINGLE/MULTIPLE: CPT | Performed by: INTERNAL MEDICINE

## 2021-11-01 PROCEDURE — 88305 TISSUE EXAM BY PATHOLOGIST: CPT | Performed by: INTERNAL MEDICINE

## 2021-11-01 RX ORDER — SODIUM CHLORIDE, SODIUM LACTATE, POTASSIUM CHLORIDE, CALCIUM CHLORIDE 600; 310; 30; 20 MG/100ML; MG/100ML; MG/100ML; MG/100ML
30 INJECTION, SOLUTION INTRAVENOUS CONTINUOUS PRN
Status: DISCONTINUED | OUTPATIENT
Start: 2021-11-01 | End: 2021-11-01 | Stop reason: HOSPADM

## 2021-11-01 RX ORDER — PROPOFOL 10 MG/ML
VIAL (ML) INTRAVENOUS CONTINUOUS PRN
Status: DISCONTINUED | OUTPATIENT
Start: 2021-11-01 | End: 2021-11-01 | Stop reason: SURG

## 2021-11-01 RX ORDER — LIDOCAINE HYDROCHLORIDE 20 MG/ML
INJECTION, SOLUTION INFILTRATION; PERINEURAL AS NEEDED
Status: DISCONTINUED | OUTPATIENT
Start: 2021-11-01 | End: 2021-11-01 | Stop reason: SURG

## 2021-11-01 RX ORDER — ONDANSETRON 2 MG/ML
INJECTION INTRAMUSCULAR; INTRAVENOUS AS NEEDED
Status: DISCONTINUED | OUTPATIENT
Start: 2021-11-01 | End: 2021-11-01 | Stop reason: SURG

## 2021-11-01 RX ORDER — GLYCOPYRROLATE 0.2 MG/ML
INJECTION INTRAMUSCULAR; INTRAVENOUS AS NEEDED
Status: DISCONTINUED | OUTPATIENT
Start: 2021-11-01 | End: 2021-11-01 | Stop reason: SURG

## 2021-11-01 RX ORDER — SODIUM CHLORIDE 0.9 % (FLUSH) 0.9 %
10 SYRINGE (ML) INJECTION AS NEEDED
Status: DISCONTINUED | OUTPATIENT
Start: 2021-11-01 | End: 2021-11-01 | Stop reason: HOSPADM

## 2021-11-01 RX ORDER — SCOLOPAMINE TRANSDERMAL SYSTEM 1 MG/1
1 PATCH, EXTENDED RELEASE TRANSDERMAL
Qty: 10 EACH | Refills: 0 | Status: SHIPPED | OUTPATIENT
Start: 2021-11-01

## 2021-11-01 RX ADMIN — GLYCOPYRROLATE 0.2 MG: 0.2 INJECTION INTRAMUSCULAR; INTRAVENOUS at 13:53

## 2021-11-01 RX ADMIN — LIDOCAINE HYDROCHLORIDE 60 MG: 20 INJECTION, SOLUTION INFILTRATION; PERINEURAL at 13:53

## 2021-11-01 RX ADMIN — PROPOFOL 300 MCG/KG/MIN: 10 INJECTION, EMULSION INTRAVENOUS at 13:53

## 2021-11-01 RX ADMIN — SODIUM CHLORIDE, POTASSIUM CHLORIDE, SODIUM LACTATE AND CALCIUM CHLORIDE 30 ML/HR: 600; 310; 30; 20 INJECTION, SOLUTION INTRAVENOUS at 13:30

## 2021-11-01 RX ADMIN — ONDANSETRON 4 MG: 2 INJECTION INTRAMUSCULAR; INTRAVENOUS at 13:53

## 2021-11-01 NOTE — ANESTHESIA POSTPROCEDURE EVALUATION
"Patient: Itzel Moses    Procedure Summary     Date: 11/01/21 Room / Location:  [facility]    Anesthesia Start: 1350 Anesthesia Stop: 1402    Procedure: ESOPHAGOGASTRODUODENOSCOPY WITH BX'S (N/A Esophagus) Diagnosis:       Black stool      Pain of upper abdomen      Erosive gastritis      Duodenitis      (Black stool [K92.1])      (Pain of upper abdomen [R10.10])      (Erosive gastritis [K29.60])      (Duodenitis [K29.80])    Surgeons: Merly Duque MD Provider: Mario Alberto Nieto MD    Anesthesia Type: MAC ASA Status: 3          Anesthesia Type: MAC    Vitals  Vitals Value Taken Time   /80 11/01/21 1426   Temp     Pulse 59 11/01/21 1426   Resp 19 11/01/21 1426   SpO2 95 % 11/01/21 1426           Post Anesthesia Care and Evaluation    Patient location during evaluation: PACU  Patient participation: complete - patient participated  Level of consciousness: awake  Pain score: 0  Pain management: adequate  Airway patency: patent  Anesthetic complications: No anesthetic complications  PONV Status: none  Cardiovascular status: acceptable  Respiratory status: acceptable  Hydration status: acceptable    Comments: /80 (BP Location: Left arm, Patient Position: Lying)   Pulse 59   Temp 36.7 °C (98 °F) (Oral)   Resp 19   Ht 157.5 cm (62\")   Wt 62.6 kg (138 lb)   SpO2 95%   BMI 25.24 kg/m²       "

## 2021-11-01 NOTE — DISCHARGE INSTRUCTIONS
For the next 24 hours patient needs to be with a responsible adult.    For 24 hours DO NOT drive, operate machinery, appliances, drink alcohol, make important decisions or sign legal documents.    Start with a light or bland diet if you are feeling sick to your stomach otherwise advance to regular diet as tolerated.    Follow recommendations on procedure report if provided by your doctor.    Call Dr Duque for problems [phone]    Problems may include but not limited to: large amounts of bleeding, trouble breathing, repeated vomiting, severe unrelieved pain, fever or chills.

## 2021-11-01 NOTE — H&P
[facility]  Pre Procedure History & Physical    Chief Complaint:   abd pain and nausea    Subjective     HPI:   55-year-old female presents today for follow-up visit for diarrhea.  She is a patient of Dr. Duque.  She was last seen on telehealth visit by me on 5/4/2020.  She has a history of GERD/erosive gastritis/duodenitis and admits lately she has been having a lot of issues with upper abdominal pain, gas, bloating and fecal urgency.  She is also been having some episodes of black stools and diarrhea.  Normally she does well on Protonix 40 mg daily but admits lately just has not been working.  She tells me she has been under a lot of stress and is worried maybe she has developed an ulcer or something.  She does have a family history of colon cancer.  Her last EGD and colonoscopy was on 10/25/2019.  She denies any dysphagia, nausea and vomiting, constipation or rectal bleeding.  She admits her appetite is okay and her weight is stable.    She reports a lot of motion sickness - she has some vertiginous symptoms.    Past Medical History:   Past Medical History:   Diagnosis Date   • Allergic conjunctivitis of right eye 06/25/2016    SEEN AT [facility]   • Allergic rhinitis    • Anxiety    • Asthma    • CMC arthritis 12/2012   • Depression    • Duodenitis 05/2020   • Dyspepsia    • Environmental allergies    • Erosive gastritis 05/2020   • GERD (gastroesophageal reflux disease)    • Hammertoe of right foot 06/2018   • Hemorrhoids    • Hiatal hernia    • Migraines    • Seasonal allergies        Past Surgical History:  Past Surgical History:   Procedure Laterality Date   • BLEPHAROPLASTY Bilateral    • BREAST LUMPECTOMY  2005   • COLONOSCOPY N/A 2015    normal per patient, NO RECORDS, DONE IN [place]   • COLONOSCOPY N/A 10/25/2019    A FEW SMALL DIVERTICULA IN SIGMOID, GRADE 2 HEMORRHOIDS, A PATCHY AREA OF ATROPHIC AND GRANULAR MUCOSA IN TERMINAL ILEUM, RESCOPE IN 5 YRS, DR. MERRILL DUQUE AT  [facility]   • ENDOSCOPY N/A 10/25/2019    EROSIVE GASTRITIS, DUODENITIS, DR. MERRILL BURKETT AT [facility]   • KNEE SURGERY  2010    ACL REPAIR   • OVARIAN CYST REMOVAL  1988       Family History:  Family History   Problem Relation Age of Onset   • Liver cancer Mother    • Colon cancer Mother    • Cancer Mother    • Colon polyps Mother    • Prostate cancer Father    • Colon cancer Father    • Heart attack Father    • Melanoma Father    • Cancer Father    • Colon polyps Father    • Heart disease Father    • Cancer Sister        Social History:   reports that she quit smoking about 12 years ago. Her smoking use included cigarettes. She has never used smokeless tobacco. She reports current alcohol use. She reports that she does not use drugs.    Medications:   Medications Prior to Admission   Medication Sig Dispense Refill Last Dose   • cetirizine (zyrTEC) 10 MG tablet Take 10 mg by mouth Daily.   Past Week at Unknown time   • Collagen 500 MG capsule Take  by mouth.   10/31/2021 at Unknown time   • estradiol (VIVELLE-DOT) 0.0375 MG/24HR patch 1 (ONE) PATCH TWO TIMES A WEEK   Past Week at Unknown time   • pantoprazole (PROTONIX) 40 MG EC tablet Take 1 tablet by mouth 2 (Two) Times a Day Before Meals. 180 tablet 1 10/31/2021 at Unknown time   • Probiotic Product (PROBIOTIC PO) Take 3 tablets by mouth Daily. Total Restore   10/31/2021 at Unknown time   • sertraline (ZOLOFT) 50 MG tablet Take 50 mg by mouth daily.   11/1/2021 at 0800   • sucralfate (Carafate) 1 g tablet Take 1 tablet by mouth 2 (Two) Times a Day Before Meals. Dissolve in water and drink as a slurry 60 tablet 3 10/31/2021 at Unknown time   • TROKENDI XR 50 MG capsule sustained-release 24 hr Take 1 capsule by mouth every night at bedtime.   10/31/2021 at Unknown time   • TURMERIC PO Take  by mouth.   10/31/2021 at Unknown time   • Omega-3 Fatty Acids (FISH OIL) 1000 MG capsule capsule Take 1,000 mg by mouth Daily With Breakfast.      •  "topiramate (TOPAMAX) 25 MG tablet Take 75 mg by mouth Daily.          Allergies:  Patient has no known allergies.    ROS:    Pertinent items are noted in HPI, all other systems reviewed and negative     Objective     Blood pressure 114/79, pulse 60, temperature 98 °F (36.7 °C), temperature source Oral, resp. rate 16, height 157.5 cm (62\"), weight 62.6 kg (138 lb), SpO2 97 %, not currently breastfeeding.    Physical Exam   Constitutional: Pt is oriented to person, place, and time and well-developed, well-nourished, and in no distress.   Mouth/Throat: Oropharynx is clear and moist.   Neck: Normal range of motion.   Cardiovascular: Normal rate, regular rhythm    Pulmonary/Chest: Effort normal    Abdominal: Soft. Nontender  Skin: Skin is warm and dry.   Psychiatric: Mood, memory, affect and judgment normal.     Assessment/Plan     Diagnosis:  Nausea and abdominal pain    Anticipated Surgical Procedure:  egd with biopsy    The risks, benefits, and alternatives of this procedure have been discussed with the patient or the responsible party- the patient understands and agrees to proceed.                                                              "

## 2021-11-03 LAB
LAB AP CASE REPORT: NORMAL
PATH REPORT.FINAL DX SPEC: NORMAL
PATH REPORT.GROSS SPEC: NORMAL

## 2021-11-10 ENCOUNTER — OFFICE VISIT (OUTPATIENT)
Dept: GASTROENTEROLOGY | Facility: CLINIC | Age: 56
End: 2021-11-10

## 2021-11-10 VITALS — WEIGHT: 142 LBS | BODY MASS INDEX: 26.13 KG/M2 | HEIGHT: 62 IN | TEMPERATURE: 97.5 F

## 2021-11-10 DIAGNOSIS — K21.9 GASTROESOPHAGEAL REFLUX DISEASE WITHOUT ESOPHAGITIS: ICD-10-CM

## 2021-11-10 DIAGNOSIS — R14.0 ABDOMINAL BLOATING: ICD-10-CM

## 2021-11-10 DIAGNOSIS — R11.0 NAUSEA: ICD-10-CM

## 2021-11-10 DIAGNOSIS — R10.11 RIGHT UPPER QUADRANT ABDOMINAL PAIN: Primary | ICD-10-CM

## 2021-11-10 DIAGNOSIS — K59.00 CONSTIPATION, UNSPECIFIED CONSTIPATION TYPE: ICD-10-CM

## 2021-11-10 PROCEDURE — 99214 OFFICE O/P EST MOD 30 MIN: CPT | Performed by: NURSE PRACTITIONER

## 2021-11-10 NOTE — PROGRESS NOTES
Chief Complaint   Patient presents with   • Black or Bloody Stool   • Diarrhea   • Abdominal Pain   • Gastritis       Itzel Moses is a  56 y.o. female here for a follow up visit for abdominal pain.    HPI  56-year-old female presents today for follow-up visit for abdominal pain and nausea. She is a patient of Dr. Duque. She was last seen in the office by me on 10/11/2021. She underwent EGD on 11/1/2021. It showed gastritis. Path was negative. She has a history of GERD/erosive esophagitis and is doing better on Protonix 40 mg twice daily. She was able to stop the Carafate. She is still quite frustrated and upset because she is continuing to have issues. Since her last visit with me she has had even more symptoms pop up. Now she is having issues with constipation, right-sided abdominal pain that radiates to her back, bloating, gas and nausea. She is even now having motion sickness to the point that she cannot cut her grass using a walking lawnmower. She does admit the scopolamine patch Dr. Duque gave her after her EGD is definitely helpful. She does not currently have a PCP and is trying to get a new one. She is very worried that something ominous is going on. She just does not feel like herself at all. Constipation is new for her. She is been try to take stool softeners but it is not working. She denies any dysphagia, vomiting, diarrhea, rectal bleeding or melena. She admits appetite is okay and her weight appears stable. She does me the right side abdominal pain radiates to her back and nothing really seems to make it better. Sometimes it is worse with standing sometimes is worse with lying down. It does not really seem better or worse when she eats or has a bowel movement. Sometimes having a big bowel movement does make it feel a little bit better. She did have stool studies done recently which were normal. Labs drawn last month were normal. She does have history of appendectomy.  Past Medical History:    Diagnosis Date   • Allergic conjunctivitis of right eye 2016    SEEN AT [facility]   • Allergic rhinitis    • Anxiety    • Asthma    • CMC arthritis 2012   • Depression    • Duodenitis 2020   • Dyspepsia    • Environmental allergies    • Erosive gastritis 2020   • GERD (gastroesophageal reflux disease)    • Hammertoe of right foot 2018   • Hemorrhoids    • Hiatal hernia    • Migraines    • Seasonal allergies        Past Surgical History:   Procedure Laterality Date   • BLEPHAROPLASTY Bilateral    • BREAST LUMPECTOMY     • COLONOSCOPY N/A     normal per patient, NO RECORDS, DONE IN [place]   • COLONOSCOPY N/A 10/25/2019    A FEW SMALL DIVERTICULA IN SIGMOID, GRADE 2 HEMORRHOIDS, A PATCHY AREA OF ATROPHIC AND GRANULAR MUCOSA IN TERMINAL ILEUM, RESCOPE IN 5 YRS, DR. MERLY DUQUE AT [facility]   • ENDOSCOPY N/A 10/25/2019    EROSIVE GASTRITIS, DUODENITIS, DR. MERLY DUQUE AT [facility]   • ENDOSCOPY N/A 2021    Procedure: ESOPHAGOGASTRODUODENOSCOPY WITH BX'S;  Surgeon: Merly Duque MD;  Location: [facility] END[facility];  Service: Gastroenterology;  Laterality: N/A;  pre: ABDOMINAL PAIN, NAUSEA  post: GASTRITIS   • KNEE SURGERY      ACL REPAIR   • OVARIAN CYST REMOVAL         Scheduled Meds:    Continuous Infusions:No current facility-administered medications for this visit.      PRN Meds:.    No Known Allergies    Social History     Socioeconomic History   • Marital status:    Tobacco Use   • Smoking status: Former Smoker     Types: Cigarettes     Quit date: 2009     Years since quittin.8   • Smokeless tobacco: Never Used   • Tobacco comment: social   Substance and Sexual Activity   • Alcohol use: Yes     Comment: MODERATE AMT   • Drug use: No   • Sexual activity: Defer     Birth control/protection: Post-menopausal       Family History   Problem Relation Age of Onset   • Liver cancer Mother    • Colon cancer Mother    • Cancer Mother    • Colon  polyps Mother    • Prostate cancer Father    • Colon cancer Father    • Heart attack Father    • Melanoma Father    • Cancer Father    • Colon polyps Father    • Heart disease Father    • Cancer Sister        Review of Systems   Constitutional: Negative for appetite change, chills, diaphoresis, fatigue, fever and unexpected weight change.   HENT: Negative for nosebleeds, postnasal drip, sore throat, trouble swallowing and voice change.    Respiratory: Negative for cough, choking, chest tightness, shortness of breath, wheezing and stridor.    Cardiovascular: Negative for chest pain, palpitations and leg swelling.   Gastrointestinal: Positive for abdominal distention, abdominal pain, constipation and nausea. Negative for anal bleeding, blood in stool, diarrhea, rectal pain and vomiting.   Endocrine: Negative for polydipsia, polyphagia and polyuria.   Musculoskeletal: Positive for back pain. Negative for gait problem.   Skin: Negative for rash and wound.   Allergic/Immunologic: Negative for food allergies.   Neurological: Positive for dizziness and light-headedness. Negative for speech difficulty.   Psychiatric/Behavioral: Negative for confusion, self-injury, sleep disturbance and suicidal ideas.       Vitals:    11/10/21 0939   Temp: 97.5 °F (36.4 °C)       Physical Exam  Constitutional:       General: She is not in acute distress.     Appearance: She is well-developed. She is not ill-appearing.   HENT:      Head: Normocephalic.   Eyes:      Pupils: Pupils are equal, round, and reactive to light.   Cardiovascular:      Rate and Rhythm: Normal rate and regular rhythm.      Heart sounds: Normal heart sounds.   Pulmonary:      Effort: Pulmonary effort is normal.      Breath sounds: Normal breath sounds.   Abdominal:      General: Bowel sounds are normal. There is distension.      Palpations: Abdomen is soft. There is no mass.      Tenderness: There is abdominal tenderness. There is no guarding or rebound.      Hernia: No  hernia is present.       Musculoskeletal:         General: Normal range of motion.   Skin:     General: Skin is warm and dry.   Neurological:      Mental Status: She is alert and oriented to person, place, and time.   Psychiatric:         Speech: Speech normal.         Behavior: Behavior normal.         Judgment: Judgment normal.         No radiology results for the last 7 days     Diagnoses and all orders for this visit:    1. Right upper quadrant abdominal pain (Primary)  -     CBC & Differential  -     Comprehensive Metabolic Panel  -     Amylase  -     Lipase  -     TSH  -     Vitamin B12  -     Vitamin D 25 Hydroxy  -     CT Abdomen Pelvis With Contrast; Future    2. Constipation, unspecified constipation type  -     CT Abdomen Pelvis With Contrast; Future    3. Gastroesophageal reflux disease without esophagitis  -     CT Abdomen Pelvis With Contrast; Future    4. Nausea  -     CBC & Differential  -     Comprehensive Metabolic Panel  -     Amylase  -     Lipase  -     TSH  -     Vitamin B12  -     Vitamin D 25 Hydroxy  -     CT Abdomen Pelvis With Contrast; Future    5. Abdominal bloating  -     CT Abdomen Pelvis With Contrast; Future      Reviewed EGD results with her today. EGD was much improved from her previous one. Now only showing gastritis. Erosive esophagitis has resolved. Continue pantoprazole 40 mg twice daily. She is able to stop Carafate. Still having a lot of issues. She is having new symptoms since last visit including dizziness, motion sickness, continued nausea and abdominal bloating with constipation. She is also having some right sided abdominal pain that radiates to her back. Given her continued issues will check some labs today and get a CT scan of the abdomen and pelvis for further evaluation. Continue the scopolamine patch since it is helping her nausea. For the constipation I would like her to trial some MiraLAX daily or every other day. She is to increase her water and fiber in her  diet. Patient to call the office next week with an update. I did advise the patient if her symptoms were to get worse to go on over to the [facility] for immediate evaluation. Patient to keep her follow-up in January with Dr. Duque. Patient is agreeable to the plan.

## 2021-11-11 ENCOUNTER — TELEPHONE (OUTPATIENT)
Dept: GASTROENTEROLOGY | Facility: CLINIC | Age: 56
End: 2021-11-11

## 2021-11-11 LAB
25(OH)D3+25(OH)D2 SERPL-MCNC: 118 NG/ML (ref 30–100)
ALBUMIN SERPL-MCNC: 4.5 G/DL (ref 3.8–4.9)
ALBUMIN/GLOB SERPL: 1.9 {RATIO} (ref 1.2–2.2)
ALP SERPL-CCNC: 46 IU/L (ref 44–121)
ALT SERPL-CCNC: 11 IU/L (ref 0–32)
AMYLASE SERPL-CCNC: 71 U/L (ref 31–110)
AST SERPL-CCNC: 14 IU/L (ref 0–40)
BASOPHILS # BLD AUTO: 0.1 X10E3/UL (ref 0–0.2)
BASOPHILS NFR BLD AUTO: 1 %
BILIRUB SERPL-MCNC: 0.6 MG/DL (ref 0–1.2)
BUN SERPL-MCNC: 11 MG/DL (ref 6–24)
BUN/CREAT SERPL: 12 (ref 9–23)
CALCIUM SERPL-MCNC: 9.3 MG/DL (ref 8.7–10.2)
CHLORIDE SERPL-SCNC: 106 MMOL/L (ref 96–106)
CO2 SERPL-SCNC: 23 MMOL/L (ref 20–29)
CREAT SERPL-MCNC: 0.94 MG/DL (ref 0.57–1)
EOSINOPHIL # BLD AUTO: 0.1 X10E3/UL (ref 0–0.4)
EOSINOPHIL NFR BLD AUTO: 4 %
ERYTHROCYTE [DISTWIDTH] IN BLOOD BY AUTOMATED COUNT: 11.7 % (ref 11.7–15.4)
GLOBULIN SER CALC-MCNC: 2.4 G/DL (ref 1.5–4.5)
GLUCOSE SERPL-MCNC: 85 MG/DL (ref 65–99)
HCT VFR BLD AUTO: 39.9 % (ref 34–46.6)
HGB BLD-MCNC: 13.6 G/DL (ref 11.1–15.9)
IMM GRANULOCYTES # BLD AUTO: 0 X10E3/UL (ref 0–0.1)
IMM GRANULOCYTES NFR BLD AUTO: 0 %
LIPASE SERPL-CCNC: 41 U/L (ref 14–72)
LYMPHOCYTES # BLD AUTO: 1.4 X10E3/UL (ref 0.7–3.1)
LYMPHOCYTES NFR BLD AUTO: 34 %
MCH RBC QN AUTO: 33.4 PG (ref 26.6–33)
MCHC RBC AUTO-ENTMCNC: 34.1 G/DL (ref 31.5–35.7)
MCV RBC AUTO: 98 FL (ref 79–97)
MONOCYTES # BLD AUTO: 0.3 X10E3/UL (ref 0.1–0.9)
MONOCYTES NFR BLD AUTO: 7 %
NEUTROPHILS # BLD AUTO: 2.2 X10E3/UL (ref 1.4–7)
NEUTROPHILS NFR BLD AUTO: 54 %
PLATELET # BLD AUTO: 209 X10E3/UL (ref 150–450)
POTASSIUM SERPL-SCNC: 4.4 MMOL/L (ref 3.5–5.2)
PROT SERPL-MCNC: 6.9 G/DL (ref 6–8.5)
RBC # BLD AUTO: 4.07 X10E6/UL (ref 3.77–5.28)
SODIUM SERPL-SCNC: 140 MMOL/L (ref 134–144)
TSH SERPL DL<=0.005 MIU/L-ACNC: 1.44 UIU/ML (ref 0.45–4.5)
VIT B12 SERPL-MCNC: 332 PG/ML (ref 232–1245)
WBC # BLD AUTO: 4 X10E3/UL (ref 3.4–10.8)

## 2021-11-11 NOTE — TELEPHONE ENCOUNTER
----- Message from MILLY Ruiz sent at 11/11/2021  9:13 AM EST -----  Please call the patient and let her know her labs look good.  Thanks

## 2021-11-11 NOTE — TELEPHONE ENCOUNTER
Call to pt.  Advise per WEI Manrique note. Verb understanding.     States has not heard re: scheduling CT.  Advise may contact [employer] to arrange if has not heard by next wk.  States will do so.

## 2021-11-16 ENCOUNTER — TELEPHONE (OUTPATIENT)
Dept: GASTROENTEROLOGY | Facility: CLINIC | Age: 56
End: 2021-11-16

## 2021-11-16 NOTE — TELEPHONE ENCOUNTER
----- Message from Merly Duque MD sent at 11/15/2021 12:56 PM EST -----  Small bowel biopsies were normal.  Mild chronic inactive inflammation was seen on stomach biopsies.  Continue pantoprazole twice daily, any improvement with the scopolamine patch?

## 2021-11-17 NOTE — TELEPHONE ENCOUNTER
Returned call to pt and advised of Dr Duque's note. Verb understanding.       Pt does report that the scopolamine patch did help with her nausea. Update sent to Dr Duque

## 2021-11-23 ENCOUNTER — TELEPHONE (OUTPATIENT)
Dept: GASTROENTEROLOGY | Facility: CLINIC | Age: 56
End: 2021-11-23

## 2021-11-23 NOTE — TELEPHONE ENCOUNTER
----- Message from Pavel Burch Rep sent at 11/23/2021  8:06 AM EST -----  Regarding: order  Contact: [phone]  Pt needs order sent to [place] for her CT scan per her insurance. Auth# [id number]    [place]  [address]  [phone]  [phone]

## 2021-11-30 ENCOUNTER — APPOINTMENT (OUTPATIENT)
Dept: CT IMAGING | Facility: HOSPITAL | Age: 56
End: 2021-11-30

## 2021-12-08 ENCOUNTER — TELEPHONE (OUTPATIENT)
Dept: GASTROENTEROLOGY | Facility: CLINIC | Age: 56
End: 2021-12-08

## 2021-12-08 NOTE — TELEPHONE ENCOUNTER
----- Message from MILLY Ruiz sent at 12/7/2021  9:50 AM EST -----  Please call the patient and let her know her CT scan of the abdomen pelvis showed mild hepatic steatosis.  Otherwise unremarkable.  Thanks

## 2021-12-23 ENCOUNTER — TELEPHONE (OUTPATIENT)
Dept: GASTROENTEROLOGY | Facility: CLINIC | Age: 56
End: 2021-12-23

## 2021-12-23 NOTE — TELEPHONE ENCOUNTER
"Faxed request from [facility] for sucralfate 1 tab po 2x/day, 90 day supply.    See o/v note of 11/10/21: \"She was able to stop the Carafate\".    Denial faxed to [phone] - confirmation received.   "

## 2022-01-05 ENCOUNTER — OFFICE VISIT (OUTPATIENT)
Dept: GASTROENTEROLOGY | Facility: CLINIC | Age: 57
End: 2022-01-05

## 2022-01-05 VITALS — WEIGHT: 142 LBS | HEIGHT: 62 IN | BODY MASS INDEX: 26.13 KG/M2

## 2022-01-05 DIAGNOSIS — R42 VERTIGO: ICD-10-CM

## 2022-01-05 DIAGNOSIS — Z80.0 FH: COLON CANCER: ICD-10-CM

## 2022-01-05 DIAGNOSIS — K21.9 GASTROESOPHAGEAL REFLUX DISEASE WITHOUT ESOPHAGITIS: Primary | ICD-10-CM

## 2022-01-05 DIAGNOSIS — K76.0 FATTY LIVER: ICD-10-CM

## 2022-01-05 PROCEDURE — 99442 PR PHYS/QHP TELEPHONE EVALUATION 11-20 MIN: CPT | Performed by: INTERNAL MEDICINE

## 2022-01-05 NOTE — PROGRESS NOTES
Patient has consented to proceed with a telehealth (telephone) visit in lieu of an office visit given the coronavirus epidemic.    This encounter was provided via real-time audio/video technology.    Subjective     History of present illness:    56 y.o. female for f/u for gerd and nausea.  She was last seen in the office 11/21 by WEI ATKINS.    She has an egd in early November 2021 which was fairly normal.  She was started on scopolamine patch thereafter and was able to wean off the carafate.    She reports that a lot of her issues started after her father passed away.    She had a lot of dizziness, nausea - worse with head turning.  Mimicked her gerd.  She had loss of appetite and felt better when she laid down.  She has undergone PT for vertigo which has helped.   she has a hole in her macula and is having surgery for this.  She is overall feeling much better and not having much in the way of gastric symptoms at this time.    She has cut back on pantoprazole to once daily.    CT scan with mild fatty liver - nml LFTs 11/21.    Past Medical History:  Past Medical History:   Diagnosis Date   • Allergic conjunctivitis of right eye 06/25/2016    SEEN AT [facility]   • Allergic rhinitis    • Anxiety    • Asthma    • CMC arthritis 12/2012   • Depression    • Duodenitis 05/2020   • Dyspepsia    • Environmental allergies    • Erosive gastritis 05/2020   • GERD (gastroesophageal reflux disease)    • Hammertoe of right foot 06/2018   • Hemorrhoids    • Hiatal hernia    • Migraines    • Seasonal allergies      Past Surgical History:  Past Surgical History:   Procedure Laterality Date   • BLEPHAROPLASTY Bilateral    • BREAST LUMPECTOMY  2005   • COLONOSCOPY N/A 2015    normal per patient, NO RECORDS, DONE IN [place]   • COLONOSCOPY N/A 10/25/2019    A FEW SMALL DIVERTICULA IN SIGMOID, GRADE 2 HEMORRHOIDS, A PATCHY AREA OF ATROPHIC AND GRANULAR MUCOSA IN TERMINAL ILEUM, RESCOPE IN 5 YRS, DR. MERRILL BURKETT AT [facility]   • ENDOSCOPY N/A 10/25/2019    EROSIVE GASTRITIS, DUODENITIS, DR. MERRILL DUQUE AT [facility]   • ENDOSCOPY N/A 2021    Procedure: ESOPHAGOGASTRODUODENOSCOPY WITH BX'S;  Surgeon: Merrill Duque MD;  Location: [facility] ENDOSCOPY;  Service: Gastroenterology;  Laterality: N/A;  pre: ABDOMINAL PAIN, NAUSEA  post: GASTRITIS   • KNEE SURGERY      ACL REPAIR   • OVARIAN CYST REMOVAL        Social History:   Social History     Tobacco Use   • Smoking status: Former Smoker     Types: Cigarettes     Quit date:      Years since quittin.0   • Smokeless tobacco: Never Used   • Tobacco comment: social   Substance Use Topics   • Alcohol use: Yes     Comment: MODERATE AMT      Family History:  Family History   Problem Relation Age of Onset   • Liver cancer Mother    • Colon cancer Mother    • Cancer Mother    • Colon polyps Mother    • Prostate cancer Father    • Colon cancer Father    • Heart attack Father    • Melanoma Father    • Cancer Father    • Colon polyps Father    • Heart disease Father    • Cancer Sister        Home Meds:    Prior to Admission medications    Medication Sig Start Date End Date Taking? Authorizing Provider   cetirizine (zyrTEC) 10 MG tablet Take 10 mg by mouth Daily.   Yes Emergency, Nurse Uday RN   Collagen 500 MG capsule Take  by mouth.   Yes Provider, MD Yomaira   estradiol (VIVELLE-DOT) 0.0375 MG/24HR patch 1 (ONE) PATCH TWO TIMES A WEEK 20  Yes ProviderYomaira MD   pantoprazole (PROTONIX) 40 MG EC tablet Take 1 tablet by mouth 2 (Two) Times a Day Before Meals.  Patient taking differently: Take 40 mg by mouth Daily. 10/11/21  Yes Katerin Manrique APRN   Probiotic Product (PROBIOTIC PO) Take 3 tablets by mouth Daily. Total Restore   Yes Provider, MD Yomaira   sertraline (ZOLOFT) 50 MG tablet Take 50 mg by mouth daily.   Yes Emergency, Nurse Uday, RN   TROKENDI XR 50 MG capsule sustained-release 24 hr Take 1 capsule by mouth every  night at bedtime. 4/28/20  Yes ProviderYomaira MD   TURMERIC PO Take  by mouth.   Yes ProviderYomaira MD   Omega-3 Fatty Acids (FISH OIL) 1000 MG capsule capsule Take 1,000 mg by mouth Daily With Breakfast.    ProviderYomaira MD   Scopolamine (Transderm-Scop, 1.5 MG,) 1 MG/3DAYS patch Place 1 patch on the skin as directed by provider Every 72 (Seventy-Two) Hours. 11/1/21   Merly Duque MD   topiramate (TOPAMAX) 25 MG tablet Take 75 mg by mouth Daily. 12/3/19   ProviderYomaira MD     Allergies:  No Known Allergies  Review of Systems  Pertinent items are noted in HPI, all other systems reviewed and negative     Objective           Assessment/Plan   Patient Active Problem List   Diagnosis   • CMC arthritis   • Hammer toe of right foot   • Black stool   • Pain of upper abdomen   • Erosive gastritis   • Duodenitis       Assessment:  1. gerd  2. Vertigo  3. FH CRC  4. Fatty liver    Plan:  · gred symptoms much improved since resolution of vertigo.  Discussed weaning off ppi if able, managing with diet/lifestyle modification  · LFTs 11/21 reviewed with pt - discussed diet modification, limiting etoh use  · Repeat c/s 2024 for screening  · Call if symptoms worsen    Time of call was 15 minutes      Merly Duque MD

## 2022-02-03 ENCOUNTER — APPOINTMENT (OUTPATIENT)
Dept: WOMENS IMAGING | Facility: HOSPITAL | Age: 57
End: 2022-02-03

## 2022-02-03 PROCEDURE — 77063 BREAST TOMOSYNTHESIS BI: CPT | Performed by: RADIOLOGY

## 2022-02-03 PROCEDURE — 77067 SCR MAMMO BI INCL CAD: CPT | Performed by: RADIOLOGY

## 2022-04-06 ENCOUNTER — OFFICE VISIT (OUTPATIENT)
Dept: GASTROENTEROLOGY | Facility: CLINIC | Age: 57
End: 2022-04-06

## 2022-04-06 VITALS — BODY MASS INDEX: 26.35 KG/M2 | TEMPERATURE: 96.6 F | WEIGHT: 143.2 LBS | HEIGHT: 62 IN

## 2022-04-06 DIAGNOSIS — R19.7 DIARRHEA OF PRESUMED INFECTIOUS ORIGIN: Primary | ICD-10-CM

## 2022-04-06 DIAGNOSIS — K21.9 GASTROESOPHAGEAL REFLUX DISEASE, UNSPECIFIED WHETHER ESOPHAGITIS PRESENT: ICD-10-CM

## 2022-04-06 DIAGNOSIS — R09.89 GLOBUS SENSATION: ICD-10-CM

## 2022-04-06 DIAGNOSIS — R10.84 GENERALIZED ABDOMINAL PAIN: ICD-10-CM

## 2022-04-06 PROCEDURE — 99214 OFFICE O/P EST MOD 30 MIN: CPT | Performed by: PHYSICIAN ASSISTANT

## 2022-04-06 RX ORDER — SWAB
SWAB, NON-MEDICATED MISCELLANEOUS
COMMUNITY

## 2022-04-06 RX ORDER — PANTOPRAZOLE SODIUM 40 MG/1
40 TABLET, DELAYED RELEASE ORAL
Qty: 180 TABLET | Refills: 1 | Status: SHIPPED | OUTPATIENT
Start: 2022-04-06

## 2022-04-06 RX ORDER — DICYCLOMINE HYDROCHLORIDE 10 MG/1
10 CAPSULE ORAL
Qty: 120 CAPSULE | Refills: 1 | Status: SHIPPED | OUTPATIENT
Start: 2022-04-06 | End: 2022-05-04

## 2022-04-06 NOTE — PROGRESS NOTES
"Chief Complaint  Nausea, Abdominal Pain, and Constipation    Subjective          History of Present Illness  Itzel Moses is a  56 y.o. female patient of Dr. Duque, new to me today, here for complaints of nausea, GERD, abdominal pain and diarrhea.    She was last seen in the office by Dr. Duque on 1/5/2022 for GERD and nausea.  She has a history of vertigo. Discussed weaning off ppi at that time.     Just returned from [place] followed by trip to [place].  She began having watery stools and was told by her PCP that she likely had Giardia.  No testing was completed.  She was placed on metronidazole but was unable to tolerate so discontinued use after 3 days.  She was told she had a significant infection she was subsequently placed on ciprofloxacin and completed a 7-day regimen.  She continues to complain of loose stools as well as diffuse, crampy abdominal pain and constant abdominal soreness.  Also concerning to her is the fact that she can \"taste the infection in her body\".  She reports a globus sensation despite 40 mg of pantoprazole daily.  She denies difficulty swallowing, melena, hematochezia.  She is concerned that she may still have a parasitic infection.    She is scheduled for a repeat colonoscopy in 2024.    Labs reviewed from [facility] showed a mild leukocytosis with a white count of 12,000.  CMP was reviewed and unremarkable aside from a glucose of 46.    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   • Depression    • Duodenitis 05/2020   • Dyspepsia    • Environmental allergies    • Erosive gastritis 05/2020   • GERD (gastroesophageal reflux disease)    • Hammertoe of right foot 06/2018   • Hemorrhoids    • Hiatal hernia    • Migraines    • Seasonal allergies        Past Surgical History:   Procedure Laterality Date   • BLEPHAROPLASTY Bilateral  "   • BREAST LUMPECTOMY     • COLONOSCOPY N/A     normal per patient, NO RECORDS, DONE IN [place]   • COLONOSCOPY N/A 10/25/2019    A FEW SMALL DIVERTICULA IN SIGMOID, GRADE 2 HEMORRHOIDS, A PATCHY AREA OF ATROPHIC AND GRANULAR MUCOSA IN TERMINAL ILEUM, RESCOPE IN 5 YRS, DR. MERLY DUQUE AT [facility]   • ENDOSCOPY N/A 10/25/2019    EROSIVE GASTRITIS, DUODENITIS, DR. MERLY DUQUE AT [facility]   • ENDOSCOPY N/A 2021    Procedure: ESOPHAGOGASTRODUODENOSCOPY WITH BX'S;  Surgeon: Merly Duque MD;  Location: [facility] ENDOSCOPY;  Service: Gastroenterology;  Laterality: N/A;  pre: ABDOMINAL PAIN, NAUSEA  post: GASTRITIS   • KNEE SURGERY      ACL REPAIR   • OVARIAN CYST REMOVAL         Family History   Problem Relation Age of Onset   • Liver cancer Mother    • Colon cancer Mother    • Cancer Mother    • Colon polyps Mother    • Prostate cancer Father    • Colon cancer Father    • Heart attack Father    • Melanoma Father    • Cancer Father    • Colon polyps Father    • Heart disease Father    • Cancer Sister        Social History     Socioeconomic History   • Marital status:    Tobacco Use   • Smoking status: Former Smoker     Types: Cigarettes     Quit date:      Years since quittin.2   • Smokeless tobacco: Never Used   • Tobacco comment: social   Substance and Sexual Activity   • Alcohol use: Yes     Comment: MODERATE AMT   • Drug use: No   • Sexual activity: Defer     Birth control/protection: Post-menopausal       No Known Allergies    Current Outpatient Medications on File Prior to Visit   Medication Sig Dispense Refill   • Collagen 500 MG capsule Take  by mouth.     • estradiol (VIVELLE-DOT) 0.0375 MG/24HR patch 1 (ONE) PATCH TWO TIMES A WEEK     • Probiotic Product (PROBIOTIC PO) Take 3 tablets by mouth Daily. Total Restore     • sertraline (ZOLOFT) 50 MG tablet Take 50 mg by mouth daily.     • TROKENDI XR 50 MG capsule sustained-release 24 hr Take 1  "capsule by mouth every night at bedtime.     • TURMERIC PO Take  by mouth.     • Vitamin D, Cholecalciferol, 10 MCG (400 UNIT) capsule Take  by mouth.     • [DISCONTINUED] pantoprazole (PROTONIX) 40 MG EC tablet Take 1 tablet by mouth 2 (Two) Times a Day Before Meals. (Patient taking differently: Take 40 mg by mouth Daily.) 180 tablet 1   • cetirizine (zyrTEC) 10 MG tablet Take 10 mg by mouth Daily.     • Omega-3 Fatty Acids (FISH OIL) 1000 MG capsule capsule Take 1,000 mg by mouth Daily With Breakfast.     • Scopolamine (Transderm-Scop, 1.5 MG,) 1 MG/3DAYS patch Place 1 patch on the skin as directed by provider Every 72 (Seventy-Two) Hours. 10 each 0   • topiramate (TOPAMAX) 25 MG tablet Take 75 mg by mouth Daily.       No current facility-administered medications on file prior to visit.       Review of Systems   Constitutional: Negative for chills and fever.   HENT: Negative for trouble swallowing.    Gastrointestinal: Positive for abdominal pain, diarrhea, nausea and GERD. Negative for abdominal distention, anal bleeding, blood in stool, constipation, rectal pain and vomiting.        Positive for globus sensation.        Objective   Vital Signs:   Temp 96.6 °F (35.9 °C)   Ht 157.5 cm (62\")   Wt 65 kg (143 lb 3.2 oz)   BMI 26.19 kg/m²       Physical Exam  Vitals and nursing note reviewed.   Constitutional:       General: She is not in acute distress.     Appearance: Normal appearance. She is not ill-appearing.   HENT:      Head: Normocephalic and atraumatic.      Right Ear: External ear normal.      Left Ear: External ear normal.   Eyes:      General: No scleral icterus.     Conjunctiva/sclera: Conjunctivae normal.      Pupils: Pupils are equal, round, and reactive to light.   Cardiovascular:      Rate and Rhythm: Normal rate and regular rhythm.      Heart sounds: Normal heart sounds.   Pulmonary:      Effort: Pulmonary effort is normal.      Breath sounds: Normal breath sounds.   Abdominal:      General: " Abdomen is flat. Bowel sounds are normal. There is no distension.      Palpations: Abdomen is soft.      Tenderness: There is abdominal tenderness. There is no guarding or rebound.   Musculoskeletal:      Cervical back: Normal range of motion and neck supple.   Skin:     General: Skin is warm and dry.   Neurological:      Mental Status: She is alert and oriented to person, place, and time.   Psychiatric:         Mood and Affect: Mood normal.         Behavior: Behavior normal.          Result Review :                     Assessment and Plan    Diagnoses and all orders for this visit:    1. Diarrhea of presumed infectious origin (Primary)  -     Gastrointestinal Panel, PCR - Stool, Per Rectum  -     Ova & Parasite Examination - Stool, Per Rectum  -     CBC & Differential  -     Basic Metabolic Panel  -     CT Abdomen Pelvis With Contrast; Future    2. Generalized abdominal pain  -     CT Abdomen Pelvis With Contrast; Future    3. Globus sensation    4. Gastroesophageal reflux disease, unspecified whether esophagitis present    Other orders  -     pantoprazole (PROTONIX) 40 MG EC tablet; Take 1 tablet by mouth 2 (Two) Times a Day Before Meals.  Dispense: 180 tablet; Refill: 1  -     dicyclomine (Bentyl) 10 MG capsule; Take 1 capsule by mouth 4 (Four) Times a Day Before Meals & at Bedtime.  Dispense: 120 capsule; Refill: 1      · Obtain CBC, BMP, O&P and GI PCR  · Obtain CT of the abdomen and pelvis.  · Increase pantoprazole to 40 mg p.o. twice daily.  · Trial of Bentyl for crampy abdominal pain.  Antireflux measures and dietary modifications reviewed. Low acid diet reviewed. Keep head of bed elevated. Stop eating/drinking at least 3 hours prior to bedtime. Eliminate caffeine and carbonated beverages.  Weight loss encouraged if BMI over 25.  Return to clinic in 4 weeks.  Should she begin to experience worsening of condition, she is to contact the office or proceed to emergency department for further  evaluation.      Follow Up   Return in about 4 weeks (around 5/4/2022).    Dragon dictation used throughout this note.     USMAN Prater

## 2022-04-11 ENCOUNTER — TELEPHONE (OUTPATIENT)
Dept: GASTROENTEROLOGY | Facility: CLINIC | Age: 57
End: 2022-04-11

## 2022-04-11 NOTE — TELEPHONE ENCOUNTER
Overdue alert received for bmp, cbc, o&p, gi pcr.      It is also noted that CT has been ordered.     VM to pt with request to contact office.

## 2022-04-12 NOTE — TELEPHONE ENCOUNTER
Call to pt.  Advise of overdue labs.  Pt checking if may go to facility in [place] to obtain labs.  [facility] in [place] - advise contact them if may complete labs and  stool kit there.  Contact office if any issues.      Pt reports would like to have CT completed at [facility] on [address].  Advise pt will send order to [facility], and to [facility] for certification.  Verb understanding.     Order faxed to [facility] @ [phone] - confirmation received.

## 2022-04-15 ENCOUNTER — TELEPHONE (OUTPATIENT)
Dept: GASTROENTEROLOGY | Facility: CLINIC | Age: 57
End: 2022-04-15

## 2022-04-15 NOTE — TELEPHONE ENCOUNTER
----- Message from Pavel Escalera sent at 4/15/2022 10:37 AM EDT -----  Regarding: Office notes  Contact: [phone]  [facility] phoned, they need  pt's office notes to show why pt needs ct scan.

## 2022-04-20 ENCOUNTER — TELEPHONE (OUTPATIENT)
Dept: GASTROENTEROLOGY | Facility: CLINIC | Age: 57
End: 2022-04-20

## 2022-04-27 LAB
BASOPHILS # BLD AUTO: 0.1 X10E3/UL (ref 0–0.2)
BASOPHILS NFR BLD AUTO: 2 %
BUN SERPL-MCNC: 12 MG/DL (ref 6–24)
BUN/CREAT SERPL: 12 (ref 9–23)
CALCIUM SERPL-MCNC: 10.3 MG/DL (ref 8.7–10.2)
CHLORIDE SERPL-SCNC: 106 MMOL/L (ref 96–106)
CO2 SERPL-SCNC: 22 MMOL/L (ref 20–29)
CREAT SERPL-MCNC: 0.98 MG/DL (ref 0.57–1)
EGFRCR SERPLBLD CKD-EPI 2021: 67 ML/MIN/1.73
EOSINOPHIL # BLD AUTO: 0.2 X10E3/UL (ref 0–0.4)
EOSINOPHIL NFR BLD AUTO: 5 %
ERYTHROCYTE [DISTWIDTH] IN BLOOD BY AUTOMATED COUNT: 11.5 % (ref 11.7–15.4)
GLUCOSE SERPL-MCNC: 85 MG/DL (ref 65–99)
HCT VFR BLD AUTO: 40.1 % (ref 34–46.6)
HGB BLD-MCNC: 13.6 G/DL (ref 11.1–15.9)
IMM GRANULOCYTES # BLD AUTO: 0 X10E3/UL (ref 0–0.1)
IMM GRANULOCYTES NFR BLD AUTO: 0 %
LYMPHOCYTES # BLD AUTO: 1.6 X10E3/UL (ref 0.7–3.1)
LYMPHOCYTES NFR BLD AUTO: 40 %
MCH RBC QN AUTO: 33.4 PG (ref 26.6–33)
MCHC RBC AUTO-ENTMCNC: 33.9 G/DL (ref 31.5–35.7)
MCV RBC AUTO: 99 FL (ref 79–97)
MONOCYTES # BLD AUTO: 0.3 X10E3/UL (ref 0.1–0.9)
MONOCYTES NFR BLD AUTO: 9 %
NEUTROPHILS # BLD AUTO: 1.8 X10E3/UL (ref 1.4–7)
NEUTROPHILS NFR BLD AUTO: 44 %
PLATELET # BLD AUTO: 227 X10E3/UL (ref 150–450)
POTASSIUM SERPL-SCNC: 5.1 MMOL/L (ref 3.5–5.2)
RBC # BLD AUTO: 4.07 X10E6/UL (ref 3.77–5.28)
SODIUM SERPL-SCNC: 142 MMOL/L (ref 134–144)
WBC # BLD AUTO: 4 X10E3/UL (ref 3.4–10.8)

## 2022-04-29 NOTE — TELEPHONE ENCOUNTER
Please contact Itzel and let her know that I have reviewed her CT. There is significant stool within the colon. I would like her to begin taking MiraLAX once daily and see if we can get her bowels moving more regularly.

## 2022-05-04 RX ORDER — DICYCLOMINE HYDROCHLORIDE 10 MG/1
10 CAPSULE ORAL
Qty: 120 CAPSULE | Refills: 1 | Status: SHIPPED | OUTPATIENT
Start: 2022-05-04

## 2022-06-22 NOTE — ANESTHESIA PREPROCEDURE EVALUATION
Anesthesia Evaluation                  Airway   Mallampati: I  TM distance: >3 FB  Neck ROM: full  No difficulty expected  Dental - normal exam     Pulmonary - normal exam   (+) a smoker Former, asthma,  Cardiovascular - normal exam        Neuro/Psych  (+) headaches, psychiatric history Anxiety and Depression,     GI/Hepatic/Renal/Endo    (+)  hiatal hernia, GERD, PUD,      Musculoskeletal     Abdominal  - normal exam    Bowel sounds: normal.   Substance History      OB/GYN          Other                        Anesthesia Plan    ASA 3     MAC       Anesthetic plan, all risks, benefits, and alternatives have been provided, discussed and informed consent has been obtained with: patient.       Olumiant Pregnancy And Lactation Text: Based on animal studies, Olumiant may cause embryo-fetal harm when administered to pregnant women.  The medication should not be used in pregnancy.  Breastfeeding is not recommended during treatment.

## 2023-06-15 ENCOUNTER — TELEPHONE (OUTPATIENT)
Dept: GASTROENTEROLOGY | Facility: CLINIC | Age: 58
End: 2023-06-15

## 2023-06-15 NOTE — TELEPHONE ENCOUNTER
Caller: Itzel Moses    Relationship: Self    Best call back number: [phone]    What is the best time to reach you: ANYTIME IF NEEDED    Who are you requesting to speak with (clinical staff, provider,  specific staff member): CLINICAL    What was the call regarding: AN MA REACHED OUT TO THE PATIENT TO SCHEDULE A FOLLOW UP TO GET HER PANTOPRAZOLE FILLED. SHE IS SCHEDULED NOW ON 10/26/23 AND IS REQUESTING HER MEDS TO BE FILLED

## 2023-06-15 NOTE — TELEPHONE ENCOUNTER
Caller: Itzel Moses    Relationship: Self    Best call back number: [phone]    Requested Prescriptions:   Requested Prescriptions      No prescriptions requested or ordered in this encounter      pantoprazole (PROTONIX) 40 MG EC tablet   Pharmacy where request should be sent:    [facility]/pharmacy #[id number] - [place], [place] - [address] - [phone]  - [phone]  [phone]     Additional details provided by patient: PATIENT TRIED TO CONTACT THE OFFICE TODAY, SHE WANTS TO MAKE SURE THIS CAN BE REFILLED. IF AN APPOINTMENT IS NECESSARY PLEASE CALL HER AT THE ABOVE NUMBER. SHE IS COMPLETELY OUT OF HER MEDICINE    Does the patient have less than a 3 day supply:  [x] Yes  [] No    Would you like a call back once the refill request has been completed: [x] Yes [] No    If the office needs to give you a call back, can they leave a voicemail: [x] Yes [] No    Pavel Roland Rep   06/15/23 08:19 EDT

## 2023-06-16 RX ORDER — PANTOPRAZOLE SODIUM 40 MG/1
40 TABLET, DELAYED RELEASE ORAL
Qty: 180 TABLET | Refills: 0 | Status: SHIPPED | OUTPATIENT
Start: 2023-06-16 | End: 2023-11-20

## 2023-10-26 ENCOUNTER — OFFICE VISIT (OUTPATIENT)
Dept: GASTROENTEROLOGY | Facility: CLINIC | Age: 58
End: 2023-10-26
Payer: COMMERCIAL

## 2023-10-26 VITALS
WEIGHT: 144 LBS | SYSTOLIC BLOOD PRESSURE: 125 MMHG | HEIGHT: 62 IN | BODY MASS INDEX: 26.5 KG/M2 | HEART RATE: 67 BPM | TEMPERATURE: 97.7 F | DIASTOLIC BLOOD PRESSURE: 89 MMHG

## 2023-10-26 DIAGNOSIS — K59.04 CHRONIC IDIOPATHIC CONSTIPATION: Primary | ICD-10-CM

## 2023-10-26 DIAGNOSIS — Z80.0 FH: COLON CANCER: ICD-10-CM

## 2023-10-26 DIAGNOSIS — K21.9 GERD WITHOUT ESOPHAGITIS: ICD-10-CM

## 2023-10-26 PROCEDURE — 99213 OFFICE O/P EST LOW 20 MIN: CPT | Performed by: INTERNAL MEDICINE

## 2023-10-26 NOTE — PROGRESS NOTES
Subjective   Chief Complaint   Patient presents with    Heartburn    Constipation    Diarrhea    Abdominal Pain     Bloated         Itzel Moses is a  58 y.o. female here for a follow up visit for gerd, constipation.    GERD has been well controlled with current regimen - no dysphagia, n/v.    She feels full in her abdomen. NOt having regular BMs.  Not taking any meds.     CT 4/22 - hepatic steatosis, constipation.  She reports difficulty losing weight.    Mom-CRC w/liver mets; Father-CRC Next colonoscopy 2024  HPI  Past Medical History:   Diagnosis Date    Allergic conjunctivitis of right eye 06/25/2016    SEEN AT [facility]    Allergic rhinitis     Anxiety     Asthma     CMC arthritis 12/2012    Depression     Duodenitis 05/2020    Dyspepsia     Environmental allergies     Erosive gastritis 05/2020    GERD (gastroesophageal reflux disease)     Hammertoe of right foot 06/2018    Hemorrhoids     Hiatal hernia     Migraines     Seasonal allergies      Past Surgical History:   Procedure Laterality Date    BLEPHAROPLASTY Bilateral     BREAST LUMPECTOMY  2005    COLONOSCOPY N/A 2015    normal per patient, NO RECORDS, DONE IN [place]    COLONOSCOPY N/A 10/25/2019    A FEW SMALL DIVERTICULA IN SIGMOID, GRADE 2 HEMORRHOIDS, A PATCHY AREA OF ATROPHIC AND GRANULAR MUCOSA IN TERMINAL ILEUM, RESCOPE IN 5 YRS, DR. MERLY DUQUE AT [facility]    ENDOSCOPY N/A 10/25/2019    EROSIVE GASTRITIS, DUODENITIS, DR. MERLY DUQUE AT [facility]    ENDOSCOPY N/A 11/1/2021    Procedure: ESOPHAGOGASTRODUODENOSCOPY WITH BX'S;  Surgeon: Merly Duque MD;  Location: [facility] ENDOSCOPY;  Service: Gastroenterology;  Laterality: N/A;  pre: ABDOMINAL PAIN, NAUSEA  post: GASTRITIS    KNEE SURGERY  2010    ACL REPAIR    OVARIAN CYST REMOVAL  1988       Current Outpatient Medications:     cetirizine (zyrTEC) 10 MG tablet, Take 1 tablet by mouth Daily., Disp: , Rfl:     Collagen 500 MG capsule, Take  by mouth., Disp: ,  Rfl:     estradiol (VIVELLE-DOT) 0.0375 MG/24HR patch, 1 (ONE) PATCH TWO TIMES A WEEK, Disp: , Rfl:     Omega-3 Fatty Acids (FISH OIL) 1000 MG capsule capsule, Take 1 capsule by mouth Daily With Breakfast., Disp: , Rfl:     pantoprazole (PROTONIX) 40 MG EC tablet, Take 1 tablet by mouth 2 (Two) Times a Day Before Meals. (Patient taking differently: Take 1 tablet by mouth Daily.), Disp: 180 tablet, Rfl: 0    sertraline (ZOLOFT) 50 MG tablet, Take 2 tablets by mouth Daily., Disp: , Rfl:     topiramate (TOPAMAX) 25 MG tablet, Take 3 tablets by mouth Daily., Disp: , Rfl:     TROKENDI XR 50 MG capsule sustained-release 24 hr, Take 1 capsule by mouth every night at bedtime., Disp: , Rfl:     TURMERIC PO, Take  by mouth., Disp: , Rfl:     Vitamin D, Cholecalciferol, 10 MCG (400 UNIT) capsule, Take  by mouth., Disp: , Rfl:   PRN Meds:.  No Known Allergies  Social History     Socioeconomic History    Marital status:    Tobacco Use    Smoking status: Former     Types: Cigarettes     Quit date:      Years since quittin.8    Smokeless tobacco: Never    Tobacco comments:     social   Substance and Sexual Activity    Alcohol use: Yes     Comment: MODERATE AMT    Drug use: No    Sexual activity: Defer     Birth control/protection: Post-menopausal     Family History   Problem Relation Age of Onset    Liver cancer Mother     Colon cancer Mother     Cancer Mother     Colon polyps Mother     Prostate cancer Father     Colon cancer Father     Heart attack Father     Melanoma Father     Cancer Father     Colon polyps Father     Heart disease Father     Cancer Sister      Review of Systems   Constitutional:  Negative for appetite change and unexpected weight change.   Gastrointestinal:  Positive for abdominal distention and constipation. Negative for blood in stool.     Vitals:    10/26/23 1117   BP: 125/89   Pulse: 67   Temp: 97.7 °F (36.5 °C)         10/26/23  111   Weight: 65.3 kg (144 lb)       Objective   Physical  Exam  Constitutional:       Appearance: Normal appearance. She is well-developed.   HENT:      Head: Normocephalic and atraumatic.   Eyes:      General: No scleral icterus.     Conjunctiva/sclera: Conjunctivae normal.   Pulmonary:      Effort: Pulmonary effort is normal.   Abdominal:      General: There is no distension.      Palpations: Abdomen is soft.   Musculoskeletal:      Cervical back: Normal range of motion and neck supple.   Skin:     General: Skin is warm and dry.   Neurological:      Mental Status: She is alert.   Psychiatric:         Mood and Affect: Mood normal.         Behavior: Behavior normal.     No radiology results for the last 7 days    Assessment & Plan   Diagnoses and all orders for this visit:    Chronic idiopathic constipation    GERD without esophagitis    FH: colon cancer      Plan:  Start fiber supplement daily - if no improvement in 4-6 weeks, advance to miralax  Recommend adequate fluid consumption daily (6 to 8 glasses of water daily), high-fiber diet, regular physical activity to reduce symptoms of constipation  Continue current regimen for GERD  Recommend diet and lifestyle modification to reduce acid reflux symptoms such as eating small meals, reducing fat caffeine and alcohol in the diet, avoid eating close to bedtime, eliminate excess weight if applicable.  Discussed increasing exercise to facilitate weight loss

## 2023-11-20 RX ORDER — PANTOPRAZOLE SODIUM 40 MG/1
40 TABLET, DELAYED RELEASE ORAL
Qty: 180 TABLET | Refills: 1 | Status: SHIPPED | OUTPATIENT
Start: 2023-11-20

## 2024-02-15 ENCOUNTER — APPOINTMENT (OUTPATIENT)
Dept: WOMENS IMAGING | Facility: HOSPITAL | Age: 59
End: 2024-02-15
Payer: COMMERCIAL

## 2024-02-15 PROCEDURE — 76642 ULTRASOUND BREAST LIMITED: CPT | Performed by: RADIOLOGY

## 2024-02-15 PROCEDURE — 77066 DX MAMMO INCL CAD BI: CPT | Performed by: RADIOLOGY

## 2024-02-15 PROCEDURE — 77062 BREAST TOMOSYNTHESIS BI: CPT | Performed by: RADIOLOGY

## 2024-02-15 PROCEDURE — G0279 TOMOSYNTHESIS, MAMMO: HCPCS | Performed by: RADIOLOGY

## 2024-02-29 ENCOUNTER — LAB REQUISITION (OUTPATIENT)
Dept: LAB | Facility: HOSPITAL | Age: 59
End: 2024-02-29
Payer: COMMERCIAL

## 2024-02-29 ENCOUNTER — APPOINTMENT (OUTPATIENT)
Dept: WOMENS IMAGING | Facility: HOSPITAL | Age: 59
End: 2024-02-29
Payer: COMMERCIAL

## 2024-02-29 DIAGNOSIS — N63.0 UNSPECIFIED LUMP IN UNSPECIFIED BREAST: ICD-10-CM

## 2024-02-29 PROCEDURE — 19083 BX BREAST 1ST LESION US IMAG: CPT | Performed by: RADIOLOGY

## 2024-02-29 PROCEDURE — 88305 TISSUE EXAM BY PATHOLOGIST: CPT | Performed by: NURSE PRACTITIONER

## 2024-02-29 PROCEDURE — 88342 IMHCHEM/IMCYTCHM 1ST ANTB: CPT | Performed by: NURSE PRACTITIONER

## 2024-02-29 PROCEDURE — 88341 IMHCHEM/IMCYTCHM EA ADD ANTB: CPT | Performed by: NURSE PRACTITIONER

## 2024-02-29 PROCEDURE — A4648 IMPLANTABLE TISSUE MARKER: HCPCS | Performed by: RADIOLOGY

## 2024-03-05 LAB
CYTO UR: NORMAL
DX PRELIMINARY: NORMAL
LAB AP CASE REPORT: NORMAL
LAB AP CLINICAL INFORMATION: NORMAL
LAB AP INTRADEPARTMENTAL CONSULT: NORMAL
LAB AP SPECIAL STAINS: NORMAL
PATH REPORT.FINAL DX SPEC: NORMAL
PATH REPORT.GROSS SPEC: NORMAL

## 2024-03-13 ENCOUNTER — OFFICE VISIT (OUTPATIENT)
Dept: SURGERY | Facility: CLINIC | Age: 59
End: 2024-03-13
Payer: COMMERCIAL

## 2024-03-13 VITALS
BODY MASS INDEX: 27.23 KG/M2 | SYSTOLIC BLOOD PRESSURE: 114 MMHG | DIASTOLIC BLOOD PRESSURE: 76 MMHG | HEIGHT: 62 IN | WEIGHT: 148 LBS

## 2024-03-13 DIAGNOSIS — N60.99 ATYPICAL LOBULAR HYPERPLASIA (ALH) OF BREAST: Primary | ICD-10-CM

## 2024-03-13 PROCEDURE — 99203 OFFICE O/P NEW LOW 30 MIN: CPT | Performed by: STUDENT IN AN ORGANIZED HEALTH CARE EDUCATION/TRAINING PROGRAM

## 2024-03-13 RX ORDER — COLD-HOT PACK
3 EACH MISCELLANEOUS
COMMUNITY
End: 2024-03-13

## 2024-03-13 NOTE — PROGRESS NOTES
GENERAL SURGERY BENIGN BREAST HISTORY AND PHYSICAL     SUMMARY:  Itzel Moses is a 58 y.o. lady with:  A new diagnosis of left breast atypical lobular hyperplasia.  -Surgical plan: Discussed plan for baseline MRI.  Discussed option for left breast wire localized excisional biopsy pending results. Risks (including bleeding, infection, damage to surrounding structures, need for additional surgery), benefits and alternatives were discussed with the patient who agreed and wished to proceed. Risk of pathologic upgrade was also discussed with possible need for additional treatment.     High risk screening:   -Solo Warner lifetime breast cancer risk: 23%. This patient does qualify for  high risk screening.  -This patient does qualify be referred to medical oncology postoperatively pending final pathology for evaluation for prophylactic endocrine therapy due to high risk for breast cancer.  -Discussed recommendation to wean hormone replacement therapy.    Breast pain:  -Discussed vitamin E supplementation.    Family history of colon cancer:   -Last colonoscopy 2021. Due 2026.     Referring Provider: No ref. provider found    Chief complaint: breast mass    HPI: Ms. Itzel Moses is seen at the request of No ref. provider found. The patient is a 58 y.o. woman being seen for a new diagnosis of left breast atypical lobular hyperplasia.      She had a couple week of left breast pain. She is now having right breast pain as well.     She had a left excisional biopsy 20 years ago that was benign.     This was initially detected as an imaging abnormality on routine screening. Her work-up is detailed in the breast history section below. She has had regular annual mammogram. She denies any prior history of abnormal mammograms or breast biopsies. She denies any breast lumps, pain, skin changes, or nipple discharge.    She has a family history of breast cancer in her maternal grandmother (age 60s). She denies any  family history of ovarian cancer.     TIMELINE OF WORKUP:  2023 bilateral screening mammogram:  There are scattered areas of fibroglandular density.  There is no mammographic evidence of malignancy.  BI-RADS Category 1    2/15/2024 bilateral diagnostic mammogram with left breast ultrasound:  There are scattered areas of fibroglandular density.  There is no radiographic abnormality in the region of the pain left breast at 1:00.  On ultrasound there is an oval parallel hypoechoic vascular mass with indistinct margins measuring 3 x 2 x 3 mm in the left breast at 1:00 4 cm from the nipple.  This is an incidental finding but located the area of concern.  Suspicious.  Ultrasound-guided biopsy is recommended.  BI-RADS Category 4B    2024 left breast ultrasound-guided biopsy:  The left breast at 1:00 was imaged and the mass was localized.  8 cores were obtained.  A mini cork tissue marker was placed.  Clip was in the expected position.  Pathology returned as focal ALH which is high risk and concordant.    2024 Pathology:   Final Diagnosis   1.  Breast, left 1 o'clock position, core biopsy:               A. Incidental focal atypical lobular hyperplasia  B. Duct ectasia and portions of cyst wall with columnar cell change     MEDICAL HISTORY:   Gynecologic History:   . P:2 AB: 0  Age at first childbirth: 24  Lactation/How long: Yes  Age at menarche: 10  Age at menopause: Hysterectomy  Total years of oral contraceptive use: 15 years previously  Total years of hormone replacement therapy: 3 years, still currently on    Past Medical History:   GERD    Past Surgical History:    Hysterectomy   Carpal tunnel     Family History:    As above    Social History:   Denies tobacco use, quit over 10 years ago  Occasional alcohol use    Allergies:   No Known Allergies    Medications:     Current Outpatient Medications:     cetirizine (zyrTEC) 10 MG tablet, Take 1 tablet by mouth Daily., Disp: , Rfl:     Collagen 500 MG  capsule, Take  by mouth., Disp: , Rfl:     estradiol (VIVELLE-DOT) 0.0375 MG/24HR patch, 1 (ONE) PATCH TWO TIMES A WEEK, Disp: , Rfl:     Omega-3 Fatty Acids (FISH OIL) 1000 MG capsule capsule, Take 1 capsule by mouth Daily With Breakfast., Disp: , Rfl:     pantoprazole (PROTONIX) 40 MG EC tablet, TAKE 1 TABLET BY MOUTH 2 TIMES A DAY BEFORE MEALS., Disp: 180 tablet, Rfl: 1    sertraline (ZOLOFT) 50 MG tablet, Take 2 tablets by mouth Daily., Disp: , Rfl:     topiramate (TOPAMAX) 25 MG tablet, Take 3 tablets by mouth Daily., Disp: , Rfl:     TROKENDI XR 50 MG capsule sustained-release 24 hr, Take 1 capsule by mouth every night at bedtime., Disp: , Rfl:     TURMERIC PO, Take  by mouth., Disp: , Rfl:     Vitamin D, Cholecalciferol, 10 MCG (400 UNIT) capsule, Take  by mouth., Disp: , Rfl:     Labs:    Labs from 4/26/2022 reviewed    ROS:   Influenza-like illness: no fever, no  cough, no  sore throat, no  body aches, no loss of sense of taste or smell, no known exposure to person with Covid-19.  Constitutional: Negative for fevers or chills  HENT: Negative for hearing loss or runny nose  Eyes: Negative for vision changes or scleral icterus  Respiratory: Negative for cough or shortness of breath  Cardiovascular: Negative for chest pain or heart palpitations  Gastrointestinal: Negative for abdominal pain, nausea, vomiting, constipation, melena, or hematochezia  Genitourinary: Negative for hematuria or dysuria  Musculoskeletal: Negative for joint swelling or gait instability  Neurologic: Negative for tremors or seizures  Psychiatric: Negative for suicidal ideations or depression  All other systems reviewed and negative    PHYSICAL EXAM:   Constitutional: Well-developed well-nourished, no acute distress  Eyes: Conjunctiva normal, sclera nonicteric  ENMT: Hearing grossly normal, oral mucosa moist  Neck: Supple, no palpable mass, trachea midline  Respiratory: Clear to auscultation, normal inspiratory effort  Cardiovascular:  Regular rate, no murmur, no peripheral edema, no jugular venous distention  Breast: symmetric  Right: No visible abnormalities on inspection while seated, with arms raised or hands on hips. No masses, skin changes, or nipple abnormalities.  Left: No visible abnormalities on inspection while seated, with arms raised or hands on hips. No masses, skin changes, or nipple abnormalities.  Biopsy site appreciated in left breast, otherwise no skin changes.   No clinical chest wall involvement.  Gastrointestinal: Soft, nontender  Lymphatics (palpable nodes): No cervical, supraclavicular or axillary lymphadenopathy  Skin:  Warm, dry, no rash on visualized skin surfaces  Musculoskeletal: Symmetric strength, normal gait  Psychiatric: Alert and oriented ×3, normal affect     BMI is >= 25 and <30. (Overweight) The following options were offered after discussion;: weight loss educational material (shared in after visit summary)       TREY GAGE M.D.  General and Endoscopic Surgery  [facility]    [address], [address]  [place], [place], [address]  P: [phone]  F: [phone]

## 2024-03-28 ENCOUNTER — TELEPHONE (OUTPATIENT)
Dept: SURGERY | Facility: CLINIC | Age: 59
End: 2024-03-28
Payer: COMMERCIAL

## 2024-03-28 NOTE — TELEPHONE ENCOUNTER
Spoke to the pt regarding the Mri breast scheduled on 4/13 @ 2:30 pm, arrival 2 pm for [facility]. Instructed pt to wear no metal to the appt.

## 2024-04-02 ENCOUNTER — PREP FOR SURGERY (OUTPATIENT)
Dept: OTHER | Facility: HOSPITAL | Age: 59
End: 2024-04-02
Payer: COMMERCIAL

## 2024-04-02 ENCOUNTER — TELEPHONE (OUTPATIENT)
Dept: SURGERY | Facility: CLINIC | Age: 59
End: 2024-04-02
Payer: COMMERCIAL

## 2024-04-02 DIAGNOSIS — N60.99 ATYPICAL LOBULAR HYPERPLASIA (ALH) OF BREAST: Primary | ICD-10-CM

## 2024-04-02 NOTE — TELEPHONE ENCOUNTER
----- Message from Marlene Dooley MD sent at 4/2/2024  8:17 AM EDT -----  Regarding: RE: mri  Thanks.     Can someone schedule her for L ex bx after 4/14?  ----- Message -----  From: Mirian Wolf RegSched Rep  Sent: 3/28/2024  11:18 AM EDT  To: Marlene Dooley MD  Subject: RE: mri                                          Mri is scheduled on 4/13 @ 2:30 pm for [facility]. Pt's aware of the appt details.     Mirian Eason  ----- Message -----  From: Marlene Dooley MD  Sent: 3/26/2024   2:57 PM EDT  To: Pavel Palma  Subject: mri                                              Can you schedule within the next two weeks please?

## 2024-04-13 ENCOUNTER — HOSPITAL ENCOUNTER (OUTPATIENT)
Dept: MRI IMAGING | Facility: HOSPITAL | Age: 59
Discharge: HOME | End: 2024-04-13
Payer: COMMERCIAL

## 2024-04-13 DIAGNOSIS — N60.99 ATYPICAL LOBULAR HYPERPLASIA (ALH) OF BREAST: ICD-10-CM

## 2024-04-13 PROCEDURE — 0 GADOBENATE DIMEGLUMINE 529 MG/ML SOLUTION: Performed by: STUDENT IN AN ORGANIZED HEALTH CARE EDUCATION/TRAINING PROGRAM

## 2024-04-13 PROCEDURE — 77049 MRI BREAST C-+ W/CAD BI: CPT

## 2024-04-13 PROCEDURE — A9577 INJ MULTIHANCE: HCPCS | Performed by: STUDENT IN AN ORGANIZED HEALTH CARE EDUCATION/TRAINING PROGRAM

## 2024-04-13 RX ADMIN — GADOBENATE DIMEGLUMINE 14 ML: 529 INJECTION, SOLUTION INTRAVENOUS at 14:47

## 2024-04-22 ENCOUNTER — TELEPHONE (OUTPATIENT)
Dept: SURGERY | Facility: CLINIC | Age: 59
End: 2024-04-22
Payer: COMMERCIAL

## 2024-05-06 ENCOUNTER — PRE-ADMISSION TESTING (OUTPATIENT)
Dept: PREADMISSION TESTING | Facility: HOSPITAL | Age: 59
End: 2024-05-06
Payer: COMMERCIAL

## 2024-05-06 VITALS
OXYGEN SATURATION: 99 % | SYSTOLIC BLOOD PRESSURE: 130 MMHG | RESPIRATION RATE: 20 BRPM | BODY MASS INDEX: 26.81 KG/M2 | DIASTOLIC BLOOD PRESSURE: 91 MMHG | HEIGHT: 61 IN | TEMPERATURE: 97.1 F | HEART RATE: 70 BPM | WEIGHT: 142 LBS

## 2024-05-06 DIAGNOSIS — N60.99 ATYPICAL LOBULAR HYPERPLASIA (ALH) OF BREAST: ICD-10-CM

## 2024-05-06 LAB
ANION GAP SERPL CALCULATED.3IONS-SCNC: 8 MMOL/L (ref 5–15)
BASOPHILS # BLD AUTO: 0.05 10*3/MM3 (ref 0–0.2)
BASOPHILS NFR BLD AUTO: 1.1 % (ref 0–1.5)
BUN SERPL-MCNC: 10 MG/DL (ref 6–20)
BUN/CREAT SERPL: 12.2 (ref 7–25)
CALCIUM SPEC-SCNC: 9.4 MG/DL (ref 8.6–10.5)
CHLORIDE SERPL-SCNC: 108 MMOL/L (ref 98–107)
CO2 SERPL-SCNC: 27 MMOL/L (ref 22–29)
CREAT SERPL-MCNC: 0.82 MG/DL (ref 0.57–1)
DEPRECATED RDW RBC AUTO: 42.7 FL (ref 37–54)
EGFRCR SERPLBLD CKD-EPI 2021: 82.5 ML/MIN/1.73
EOSINOPHIL # BLD AUTO: 0.48 10*3/MM3 (ref 0–0.4)
EOSINOPHIL NFR BLD AUTO: 10.5 % (ref 0.3–6.2)
ERYTHROCYTE [DISTWIDTH] IN BLOOD BY AUTOMATED COUNT: 11.5 % (ref 12.3–15.4)
GLUCOSE SERPL-MCNC: 88 MG/DL (ref 65–99)
HCT VFR BLD AUTO: 38.4 % (ref 34–46.6)
HGB BLD-MCNC: 12.7 G/DL (ref 12–15.9)
IMM GRANULOCYTES # BLD AUTO: 0.01 10*3/MM3 (ref 0–0.05)
IMM GRANULOCYTES NFR BLD AUTO: 0.2 % (ref 0–0.5)
LYMPHOCYTES # BLD AUTO: 1.4 10*3/MM3 (ref 0.7–3.1)
LYMPHOCYTES NFR BLD AUTO: 30.6 % (ref 19.6–45.3)
MCH RBC QN AUTO: 33.4 PG (ref 26.6–33)
MCHC RBC AUTO-ENTMCNC: 33.1 G/DL (ref 31.5–35.7)
MCV RBC AUTO: 101.1 FL (ref 79–97)
MONOCYTES # BLD AUTO: 0.26 10*3/MM3 (ref 0.1–0.9)
MONOCYTES NFR BLD AUTO: 5.7 % (ref 5–12)
NEUTROPHILS NFR BLD AUTO: 2.37 10*3/MM3 (ref 1.7–7)
NEUTROPHILS NFR BLD AUTO: 51.9 % (ref 42.7–76)
NRBC BLD AUTO-RTO: 0 /100 WBC (ref 0–0.2)
PLATELET # BLD AUTO: 211 10*3/MM3 (ref 140–450)
PMV BLD AUTO: 12.1 FL (ref 6–12)
POTASSIUM SERPL-SCNC: 4.6 MMOL/L (ref 3.5–5.2)
RBC # BLD AUTO: 3.8 10*6/MM3 (ref 3.77–5.28)
SODIUM SERPL-SCNC: 143 MMOL/L (ref 136–145)
WBC NRBC COR # BLD AUTO: 4.57 10*3/MM3 (ref 3.4–10.8)

## 2024-05-06 PROCEDURE — 80048 BASIC METABOLIC PNL TOTAL CA: CPT

## 2024-05-06 PROCEDURE — 36415 COLL VENOUS BLD VENIPUNCTURE: CPT

## 2024-05-06 PROCEDURE — 85025 COMPLETE CBC W/AUTO DIFF WBC: CPT

## 2024-05-06 RX ORDER — FAMOTIDINE 20 MG
1000 TABLET ORAL DAILY
COMMUNITY

## 2024-05-06 RX ORDER — NAPROXEN SODIUM 220 MG
440 TABLET ORAL 2 TIMES DAILY PRN
COMMUNITY

## 2024-05-24 ENCOUNTER — ANESTHESIA EVENT (OUTPATIENT)
Dept: PERIOP | Facility: HOSPITAL | Age: 59
End: 2024-05-24
Payer: COMMERCIAL

## 2024-05-24 ENCOUNTER — TRANSCRIBE ORDERS (OUTPATIENT)
Dept: LAB | Facility: HOSPITAL | Age: 59
End: 2024-05-24
Payer: COMMERCIAL

## 2024-05-24 ENCOUNTER — APPOINTMENT (OUTPATIENT)
Dept: MAMMOGRAPHY | Facility: HOSPITAL | Age: 59
End: 2024-05-24
Payer: COMMERCIAL

## 2024-05-24 ENCOUNTER — ANCILLARY PROCEDURE (OUTPATIENT)
Dept: LAB | Facility: HOSPITAL | Age: 59
End: 2024-05-24
Payer: COMMERCIAL

## 2024-05-24 ENCOUNTER — APPOINTMENT (OUTPATIENT)
Dept: GENERAL RADIOLOGY | Facility: HOSPITAL | Age: 59
End: 2024-05-24
Payer: COMMERCIAL

## 2024-05-24 ENCOUNTER — HOSPITAL ENCOUNTER (OUTPATIENT)
Facility: HOSPITAL | Age: 59
Setting detail: HOSPITAL OUTPATIENT SURGERY
Discharge: HOME | End: 2024-05-24
Attending: STUDENT IN AN ORGANIZED HEALTH CARE EDUCATION/TRAINING PROGRAM | Admitting: STUDENT IN AN ORGANIZED HEALTH CARE EDUCATION/TRAINING PROGRAM
Payer: COMMERCIAL

## 2024-05-24 ENCOUNTER — ANESTHESIA (OUTPATIENT)
Dept: PERIOP | Facility: HOSPITAL | Age: 59
End: 2024-05-24
Payer: COMMERCIAL

## 2024-05-24 VITALS
RESPIRATION RATE: 16 BRPM | TEMPERATURE: 97.3 F | OXYGEN SATURATION: 100 % | DIASTOLIC BLOOD PRESSURE: 93 MMHG | HEART RATE: 56 BPM | SYSTOLIC BLOOD PRESSURE: 123 MMHG

## 2024-05-24 DIAGNOSIS — N60.92 ATYPICAL DUCTAL HYPERPLASIA OF LEFT BREAST: Primary | ICD-10-CM

## 2024-05-24 DIAGNOSIS — N60.99 ATYPICAL LOBULAR HYPERPLASIA (ALH) OF BREAST: Primary | ICD-10-CM

## 2024-05-24 DIAGNOSIS — N60.92 ATYPICAL DUCTAL HYPERPLASIA OF LEFT BREAST: ICD-10-CM

## 2024-05-24 PROCEDURE — 25010000002 FENTANYL CITRATE (PF) 50 MCG/ML SOLUTION: Performed by: ANESTHESIOLOGY

## 2024-05-24 PROCEDURE — 25810000003 LACTATED RINGERS PER 1000 ML: Performed by: STUDENT IN AN ORGANIZED HEALTH CARE EDUCATION/TRAINING PROGRAM

## 2024-05-24 PROCEDURE — 76098 X-RAY EXAM SURGICAL SPECIMEN: CPT

## 2024-05-24 PROCEDURE — C1819 TISSUE LOCALIZATION-EXCISION: HCPCS

## 2024-05-24 PROCEDURE — 25010000002 CEFAZOLIN PER 500 MG: Performed by: STUDENT IN AN ORGANIZED HEALTH CARE EDUCATION/TRAINING PROGRAM

## 2024-05-24 PROCEDURE — 19125 EXCISION BREAST LESION: CPT | Performed by: STUDENT IN AN ORGANIZED HEALTH CARE EDUCATION/TRAINING PROGRAM

## 2024-05-24 PROCEDURE — 25010000002 PROPOFOL 10 MG/ML EMULSION: Performed by: ANESTHESIOLOGY

## 2024-05-24 PROCEDURE — 25010000002 PROPOFOL 500 MG/50ML EMULSION: Performed by: ANESTHESIOLOGY

## 2024-05-24 PROCEDURE — 88307 TISSUE EXAM BY PATHOLOGIST: CPT | Performed by: STUDENT IN AN ORGANIZED HEALTH CARE EDUCATION/TRAINING PROGRAM

## 2024-05-24 PROCEDURE — 25010000002 LIDOCAINE 1 % SOLUTION: Performed by: STUDENT IN AN ORGANIZED HEALTH CARE EDUCATION/TRAINING PROGRAM

## 2024-05-24 PROCEDURE — 19125 EXCISION BREAST LESION: CPT | Performed by: PHYSICIAN ASSISTANT

## 2024-05-24 RX ORDER — LIDOCAINE HYDROCHLORIDE 20 MG/ML
INJECTION, SOLUTION EPIDURAL; INFILTRATION; INTRACAUDAL; PERINEURAL AS NEEDED
Status: DISCONTINUED | OUTPATIENT
Start: 2024-05-24 | End: 2024-05-24 | Stop reason: SURG

## 2024-05-24 RX ORDER — DROPERIDOL 2.5 MG/ML
0.62 INJECTION, SOLUTION INTRAMUSCULAR; INTRAVENOUS
Status: CANCELLED | OUTPATIENT
Start: 2024-05-24

## 2024-05-24 RX ORDER — SODIUM CHLORIDE 0.9 % (FLUSH) 0.9 %
3-10 SYRINGE (ML) INJECTION AS NEEDED
Status: DISCONTINUED | OUTPATIENT
Start: 2024-05-24 | End: 2024-05-28 | Stop reason: HOSPADM

## 2024-05-24 RX ORDER — HYDROCODONE BITARTRATE AND ACETAMINOPHEN 5; 325 MG/1; MG/1
1 TABLET ORAL ONCE AS NEEDED
Status: CANCELLED | OUTPATIENT
Start: 2024-05-24

## 2024-05-24 RX ORDER — SODIUM CHLORIDE, SODIUM LACTATE, POTASSIUM CHLORIDE, CALCIUM CHLORIDE 600; 310; 30; 20 MG/100ML; MG/100ML; MG/100ML; MG/100ML
9 INJECTION, SOLUTION INTRAVENOUS CONTINUOUS
Status: DISCONTINUED | OUTPATIENT
Start: 2024-05-24 | End: 2024-05-28 | Stop reason: HOSPADM

## 2024-05-24 RX ORDER — SODIUM CHLORIDE 0.9 % (FLUSH) 0.9 %
3 SYRINGE (ML) INJECTION EVERY 12 HOURS SCHEDULED
Status: DISCONTINUED | OUTPATIENT
Start: 2024-05-24 | End: 2024-05-28 | Stop reason: HOSPADM

## 2024-05-24 RX ORDER — HYDROMORPHONE HYDROCHLORIDE 1 MG/ML
0.5 INJECTION, SOLUTION INTRAMUSCULAR; INTRAVENOUS; SUBCUTANEOUS
Status: CANCELLED | OUTPATIENT
Start: 2024-05-24

## 2024-05-24 RX ORDER — PROMETHAZINE HYDROCHLORIDE 25 MG/1
25 TABLET ORAL ONCE AS NEEDED
Status: CANCELLED | OUTPATIENT
Start: 2024-05-24

## 2024-05-24 RX ORDER — PROPOFOL 10 MG/ML
INJECTION, EMULSION INTRAVENOUS CONTINUOUS PRN
Status: DISCONTINUED | OUTPATIENT
Start: 2024-05-24 | End: 2024-05-24 | Stop reason: SURG

## 2024-05-24 RX ORDER — DIPHENHYDRAMINE HYDROCHLORIDE 50 MG/ML
12.5 INJECTION INTRAMUSCULAR; INTRAVENOUS
Status: CANCELLED | OUTPATIENT
Start: 2024-05-24

## 2024-05-24 RX ORDER — LIDOCAINE HYDROCHLORIDE 10 MG/ML
0.5 INJECTION, SOLUTION INFILTRATION; PERINEURAL ONCE AS NEEDED
Status: DISCONTINUED | OUTPATIENT
Start: 2024-05-24 | End: 2024-05-28 | Stop reason: HOSPADM

## 2024-05-24 RX ORDER — IPRATROPIUM BROMIDE AND ALBUTEROL SULFATE 2.5; .5 MG/3ML; MG/3ML
3 SOLUTION RESPIRATORY (INHALATION) ONCE AS NEEDED
Status: CANCELLED | OUTPATIENT
Start: 2024-05-24

## 2024-05-24 RX ORDER — NALOXONE HCL 0.4 MG/ML
0.2 VIAL (ML) INJECTION AS NEEDED
Status: CANCELLED | OUTPATIENT
Start: 2024-05-24

## 2024-05-24 RX ORDER — ACETAMINOPHEN 500 MG
1000 TABLET ORAL ONCE
Status: COMPLETED | OUTPATIENT
Start: 2024-05-24 | End: 2024-05-24

## 2024-05-24 RX ORDER — EPHEDRINE SULFATE 50 MG/ML
5 INJECTION, SOLUTION INTRAVENOUS ONCE AS NEEDED
Status: CANCELLED | OUTPATIENT
Start: 2024-05-24

## 2024-05-24 RX ORDER — DIAZEPAM 5 MG/1
5 TABLET ORAL ONCE
Status: COMPLETED | OUTPATIENT
Start: 2024-05-24 | End: 2024-05-24

## 2024-05-24 RX ORDER — LABETALOL HYDROCHLORIDE 5 MG/ML
5 INJECTION, SOLUTION INTRAVENOUS
Status: CANCELLED | OUTPATIENT
Start: 2024-05-24

## 2024-05-24 RX ORDER — TRAMADOL HYDROCHLORIDE 50 MG/1
50 TABLET ORAL EVERY 6 HOURS PRN
Qty: 8 TABLET | Refills: 0 | Status: SHIPPED | OUTPATIENT
Start: 2024-05-24 | End: 2025-05-24

## 2024-05-24 RX ORDER — ONDANSETRON 2 MG/ML
4 INJECTION INTRAMUSCULAR; INTRAVENOUS ONCE AS NEEDED
Status: CANCELLED | OUTPATIENT
Start: 2024-05-24

## 2024-05-24 RX ORDER — FENTANYL CITRATE 50 UG/ML
INJECTION, SOLUTION INTRAMUSCULAR; INTRAVENOUS AS NEEDED
Status: DISCONTINUED | OUTPATIENT
Start: 2024-05-24 | End: 2024-05-24 | Stop reason: SURG

## 2024-05-24 RX ORDER — PROMETHAZINE HYDROCHLORIDE 25 MG/1
25 SUPPOSITORY RECTAL ONCE AS NEEDED
Status: CANCELLED | OUTPATIENT
Start: 2024-05-24

## 2024-05-24 RX ORDER — MIDAZOLAM HYDROCHLORIDE 1 MG/ML
1 INJECTION INTRAMUSCULAR; INTRAVENOUS
Status: DISCONTINUED | OUTPATIENT
Start: 2024-05-24 | End: 2024-05-28 | Stop reason: HOSPADM

## 2024-05-24 RX ORDER — FLUMAZENIL 0.1 MG/ML
0.2 INJECTION INTRAVENOUS AS NEEDED
Status: CANCELLED | OUTPATIENT
Start: 2024-05-24

## 2024-05-24 RX ORDER — OXYCODONE AND ACETAMINOPHEN 7.5; 325 MG/1; MG/1
1 TABLET ORAL EVERY 4 HOURS PRN
Status: CANCELLED | OUTPATIENT
Start: 2024-05-24 | End: 2024-05-31

## 2024-05-24 RX ORDER — BUPIVACAINE HYDROCHLORIDE AND EPINEPHRINE 5; 5 MG/ML; UG/ML
INJECTION, SOLUTION EPIDURAL; INTRACAUDAL; PERINEURAL AS NEEDED
Status: DISCONTINUED | OUTPATIENT
Start: 2024-05-24 | End: 2024-05-24 | Stop reason: HOSPADM

## 2024-05-24 RX ORDER — FENTANYL CITRATE 50 UG/ML
50 INJECTION, SOLUTION INTRAMUSCULAR; INTRAVENOUS
Status: CANCELLED | OUTPATIENT
Start: 2024-05-24

## 2024-05-24 RX ORDER — PROPOFOL 10 MG/ML
VIAL (ML) INTRAVENOUS AS NEEDED
Status: DISCONTINUED | OUTPATIENT
Start: 2024-05-24 | End: 2024-05-24 | Stop reason: SURG

## 2024-05-24 RX ORDER — HYDRALAZINE HYDROCHLORIDE 20 MG/ML
5 INJECTION INTRAMUSCULAR; INTRAVENOUS
Status: CANCELLED | OUTPATIENT
Start: 2024-05-24

## 2024-05-24 RX ORDER — LIDOCAINE HYDROCHLORIDE 10 MG/ML
3 INJECTION, SOLUTION INFILTRATION; PERINEURAL ONCE
Status: COMPLETED | OUTPATIENT
Start: 2024-05-24 | End: 2024-05-24

## 2024-05-24 RX ADMIN — PROPOFOL 50 MCG/KG/MIN: 10 INJECTION, EMULSION INTRAVENOUS at 09:54

## 2024-05-24 RX ADMIN — DIAZEPAM 5 MG: 5 TABLET ORAL at 07:38

## 2024-05-24 RX ADMIN — PROPOFOL 50 MG: 10 INJECTION, EMULSION INTRAVENOUS at 09:51

## 2024-05-24 RX ADMIN — PROPOFOL 20 MG: 10 INJECTION, EMULSION INTRAVENOUS at 10:11

## 2024-05-24 RX ADMIN — PROPOFOL 30 MG: 10 INJECTION, EMULSION INTRAVENOUS at 10:12

## 2024-05-24 RX ADMIN — LIDOCAINE HYDROCHLORIDE 50 MG: 20 INJECTION, SOLUTION EPIDURAL; INFILTRATION; INTRACAUDAL; PERINEURAL at 09:52

## 2024-05-24 RX ADMIN — SODIUM CHLORIDE: 900 INJECTION INTRAVENOUS at 09:44

## 2024-05-24 RX ADMIN — ACETAMINOPHEN 1000 MG: 500 TABLET ORAL at 07:38

## 2024-05-24 RX ADMIN — LIDOCAINE HYDROCHLORIDE 3 ML: 10 INJECTION, SOLUTION INFILTRATION; PERINEURAL at 08:36

## 2024-05-24 RX ADMIN — PROPOFOL 50 MG: 10 INJECTION, EMULSION INTRAVENOUS at 09:54

## 2024-05-24 RX ADMIN — FENTANYL CITRATE 50 MCG: 50 INJECTION, SOLUTION INTRAMUSCULAR; INTRAVENOUS at 09:56

## 2024-05-24 RX ADMIN — PROPOFOL 50 MG: 10 INJECTION, EMULSION INTRAVENOUS at 09:58

## 2024-05-24 RX ADMIN — SODIUM CHLORIDE, POTASSIUM CHLORIDE, SODIUM LACTATE AND CALCIUM CHLORIDE 9 ML/HR: 600; 310; 30; 20 INJECTION, SOLUTION INTRAVENOUS at 09:44

## 2024-05-24 NOTE — OP NOTE
OPERATIVE REPORT     DATE: 5/24/2024     SURGEON: Marlene Dooley MD      ASSISTANT: Jeffrey Weiss PA-C, who was present for necessary suctioning, retracting, suturing throughout the procedure      OPERATION PERFORMED: Left breast wire localized excisional biopsy     PREOPERATIVE DIAGNOSIS: Atypical lobular hyperplasia of the left breast      POSTOPERATIVE DIAGNOSIS: Same     ANESTHESIA: MAC     SPECIMEN: Left breast wire localized excisional biopsy (short stitch superior, long lateral, double anterior)     DRAINS: None     BLOOD LOSS: Minimal       INDICATION FOR OPERATION: Mrs. Moses is a 58 y.o. lady who was recently diagnosed with atypical lobular hyperplasia of the left breast.  Excisional biopsy was offered and she was in agreement with proceeding. All risks (including bleeding, infection, damage to surrounding structures, need for further surgery, pathologic upgrade), benefits and alternatives were explained to the patient who agreed and wished to proceed. Informed consent was signed.      OPERATIVE COURSE: The patient was taken to the operating room, transferred onto the operating room table, and underwent anesthesia without incident. The patient was prepped and draped in sterile fashion. A time out was performed and preoperative antibiotics were given.  Half percent Marcaine with epinephrine was injected into the skin and subcutaneous tissues.  A curvilinear incision was made along the nipple areolar border from the 12 to 3 o'clock position.  Bovie electrocautery was used to create a flap along the length of the wire 1 cm in thickness.  The tissue was transected around the tip of the wire.  Lastly the wire was brought through the incision with 2 hemostats.  The tissue was amputated at its base.  It was marked as listed above.  Specimen radiograph confirmed clip, wire, and abnormal breast tissue.  It was sent for fresh permanent specimen.  The area was irrigated and appeared hemostatic.  There were  no signs of bleeding.  The rest of the local anesthetic was administered.  It was closed with interrupted 3-0 Vicryl sutures and a running 4-0 Monocryl suture.  Skin glue was placed over the incision.  The patient tolerated the procedure well. All needle and lap counts were correct at the end of the case. The patient was then awoken from anesthesia and taken to recovery for further monitoring.     Marlene Dooley MD   General and Endoscopic Surgery  [facility]     [address] [place] [address], [address]  [place], [place], [address]  P: [phone]  F: [phone]

## 2024-05-24 NOTE — ANESTHESIA PREPROCEDURE EVALUATION
Anesthesia Evaluation     Patient summary reviewed and Nursing notes reviewed   no history of anesthetic complications:   NPO Solid Status: > 8 hours  NPO Liquid Status: > 2 hours           Airway   Mallampati: II  TM distance: >3 FB  Neck ROM: full  Dental      Pulmonary    (+) asthma,  Cardiovascular         Neuro/Psych  GI/Hepatic/Renal/Endo    (+) GERD, PUD    Musculoskeletal     Abdominal    Substance History      OB/GYN          Other                          Anesthesia Plan    ASA 2     MAC     intravenous induction     Anesthetic plan, risks, benefits, and alternatives have been provided, discussed and informed consent has been obtained with: patient.        CODE STATUS:

## 2024-05-24 NOTE — ANESTHESIA POSTPROCEDURE EVALUATION
Patient: Itzel Moses    Procedure Summary       Date: 05/24/24 Room / Location:  [facility]    Anesthesia Start: 0948 Anesthesia Stop: 1036    Procedure: left breast needle-localized excisional biopsy (Left: Breast) Diagnosis:       Atypical lobular hyperplasia (ALH) of breast      (Atypical lobular hyperplasia (ALH) of breast [N60.99])    Surgeons: Marlene Dooley MD Provider: To Jones MD    Anesthesia Type: MAC ASA Status: 2            Anesthesia Type: MAC    Vitals  Vitals Value Taken Time   /87 05/24/24 1103   Temp 36.3 °C (97.3 °F) 05/24/24 1103   Pulse 60 05/24/24 1128   Resp 16 05/24/24 1103   SpO2 100 % 05/24/24 1128   Vitals shown include unfiled device data.        Post Anesthesia Care and Evaluation    Patient location during evaluation: bedside  Patient participation: complete - patient participated  Level of consciousness: awake and alert  Pain management: adequate    Airway patency: patent  Anesthetic complications: No anesthetic complications  PONV Status: controlled  Cardiovascular status: blood pressure returned to baseline and acceptable  Respiratory status: acceptable  Hydration status: acceptable

## 2024-05-24 NOTE — H&P
GENERAL SURGERY BENIGN BREAST HISTORY AND PHYSICAL      SUMMARY:  Itzel Moses is a 58 y.o. lady with:  A diagnosis of left breast atypical lobular hyperplasia.  -Surgical plan: left breast wire localized excisional biopsy. Risks (including bleeding, infection, damage to surrounding structures, need for additional surgery), benefits and alternatives were discussed with the patient who agreed and wished to proceed. Risk of pathologic upgrade was also discussed with possible need for additional treatment.      High risk screening:   -Solo Warner lifetime breast cancer risk: 23%. This patient does qualify for  high risk screening.  -This patient does qualify be referred to medical oncology postoperatively pending final pathology for evaluation for prophylactic endocrine therapy due to high risk for breast cancer.  -Discussed recommendation to wean hormone replacement therapy.     Breast pain:  -Discussed vitamin E supplementation.     Family history of colon cancer:   -Last colonoscopy 2021. Due 2026.      Referring Provider: No ref. provider found     Chief complaint: breast mass     HPI: Ms. Itzel Moses is seen at the request of No ref. provider found. The patient is a 58 y.o. woman being seen for a new diagnosis of left breast atypical lobular hyperplasia.       She had a couple week of left breast pain. She is now having right breast pain as well.      She had a left excisional biopsy 20 years ago that was benign.      This was initially detected as an imaging abnormality on routine screening. Her work-up is detailed in the breast history section below. She has had regular annual mammogram. She denies any prior history of abnormal mammograms or breast biopsies. She denies any breast lumps, pain, skin changes, or nipple discharge.     She has a family history of breast cancer in her maternal grandmother (age 60s). She denies any family history of ovarian cancer.      TIMELINE OF WORKUP:  2/9/2023  bilateral screening mammogram:  There are scattered areas of fibroglandular density.  There is no mammographic evidence of malignancy.  BI-RADS Category 1     2/15/2024 bilateral diagnostic mammogram with left breast ultrasound:  There are scattered areas of fibroglandular density.  There is no radiographic abnormality in the region of the pain left breast at 1:00.  On ultrasound there is an oval parallel hypoechoic vascular mass with indistinct margins measuring 3 x 2 x 3 mm in the left breast at 1:00 4 cm from the nipple.  This is an incidental finding but located the area of concern.  Suspicious.  Ultrasound-guided biopsy is recommended.  BI-RADS Category 4B     2024 left breast ultrasound-guided biopsy:  The left breast at 1:00 was imaged and the mass was localized.  8 cores were obtained.  A mini cork tissue marker was placed.  Clip was in the expected position.  Pathology returned as focal ALH which is high risk and concordant.     2024 Pathology:   Final Diagnosis   1.  Breast, left 1 o'clock position, core biopsy:               A. Incidental focal atypical lobular hyperplasia  B. Duct ectasia and portions of cyst wall with columnar cell change      2024 Bilateral Breast MRI   IMPRESSION:  There are no findings suspicious for malignancy in either breast. Routine followup imaging is recommended.    BI-RADS category 1: Negative.    MEDICAL HISTORY:   Gynecologic History:   . P:2 AB: 0  Age at first childbirth: 24  Lactation/How long: Yes  Age at menarche: 10  Age at menopause: Hysterectomy  Total years of oral contraceptive use: 15 years previously  Total years of hormone replacement therapy: 3 years, still currently on     Past Medical History:   GERD     Past Surgical History:    Hysterectomy   Carpal tunnel      Family History:    As above     Social History:   Denies tobacco use, quit over 10 years ago  Occasional alcohol use     Allergies:   Allergies   No Known Allergies         Medications:     Current Medications      Current Outpatient Medications:     cetirizine (zyrTEC) 10 MG tablet, Take 1 tablet by mouth Daily., Disp: , Rfl:     Collagen 500 MG capsule, Take  by mouth., Disp: , Rfl:     estradiol (VIVELLE-DOT) 0.0375 MG/24HR patch, 1 (ONE) PATCH TWO TIMES A WEEK, Disp: , Rfl:     Omega-3 Fatty Acids (FISH OIL) 1000 MG capsule capsule, Take 1 capsule by mouth Daily With Breakfast., Disp: , Rfl:     pantoprazole (PROTONIX) 40 MG EC tablet, TAKE 1 TABLET BY MOUTH 2 TIMES A DAY BEFORE MEALS., Disp: 180 tablet, Rfl: 1    sertraline (ZOLOFT) 50 MG tablet, Take 2 tablets by mouth Daily., Disp: , Rfl:     topiramate (TOPAMAX) 25 MG tablet, Take 3 tablets by mouth Daily., Disp: , Rfl:     TROKENDI XR 50 MG capsule sustained-release 24 hr, Take 1 capsule by mouth every night at bedtime., Disp: , Rfl:     TURMERIC PO, Take  by mouth., Disp: , Rfl:     Vitamin D, Cholecalciferol, 10 MCG (400 UNIT) capsule, Take  by mouth., Disp: , Rfl:         Labs:    Labs from 5/6/2024 reviewed     ROS:   Influenza-like illness: no fever, no  cough, no  sore throat, no  body aches, no loss of sense of taste or smell, no known exposure to person with Covid-19.  Constitutional: Negative for fevers or chills  HENT: Negative for hearing loss or runny nose  Eyes: Negative for vision changes or scleral icterus  Respiratory: Negative for cough or shortness of breath  Cardiovascular: Negative for chest pain or heart palpitations  Gastrointestinal: Negative for abdominal pain, nausea, vomiting, constipation, melena, or hematochezia  Genitourinary: Negative for hematuria or dysuria  Musculoskeletal: Negative for joint swelling or gait instability  Neurologic: Negative for tremors or seizures  Psychiatric: Negative for suicidal ideations or depression  All other systems reviewed and negative     PHYSICAL EXAM:   Constitutional: Well-developed well-nourished, no acute distress  Eyes: Conjunctiva normal, sclera  nonicteric  ENMT: Hearing grossly normal, oral mucosa moist  Neck: Supple, no palpable mass, trachea midline  Respiratory: Clear to auscultation, normal inspiratory effort  Cardiovascular: Regular rate, no murmur, no peripheral edema, no jugular venous distention  Breast: symmetric  Right: No visible abnormalities on inspection while seated, with arms raised or hands on hips. No masses, skin changes, or nipple abnormalities.  Left: No visible abnormalities on inspection while seated, with arms raised or hands on hips. No masses, skin changes, or nipple abnormalities.  Biopsy site appreciated in left breast, otherwise no skin changes.   No clinical chest wall involvement.  Gastrointestinal: Soft, nontender  Lymphatics (palpable nodes): No cervical, supraclavicular or axillary lymphadenopathy  Skin:  Warm, dry, no rash on visualized skin surfaces  Musculoskeletal: Symmetric strength, normal gait  Psychiatric: Alert and oriented ×3, normal affect      BMI is >= 25 and <30. (Overweight) The following options were offered after discussion;: weight loss educational material (shared in after visit summary)        TREY GAGE M.D.  General and Endoscopic Surgery  [facility]     [address] [place] [address], [address]  [place], [place], [address]  P: [phone]  F: [phone]

## 2024-05-25 LAB
LAB AP CASE REPORT: NORMAL
LAB AP CLINICAL INFORMATION: NORMAL
PATH REPORT.FINAL DX SPEC: NORMAL
PATH REPORT.GROSS SPEC: NORMAL

## 2024-05-29 ENCOUNTER — TELEPHONE (OUTPATIENT)
Dept: SURGERY | Facility: CLINIC | Age: 59
End: 2024-05-29
Payer: COMMERCIAL

## 2024-06-07 RX ORDER — PANTOPRAZOLE SODIUM 40 MG/1
40 TABLET, DELAYED RELEASE ORAL
Qty: 180 TABLET | Refills: 0 | Status: SHIPPED | OUTPATIENT
Start: 2024-06-07

## 2024-06-12 ENCOUNTER — OFFICE VISIT (OUTPATIENT)
Dept: SURGERY | Facility: CLINIC | Age: 59
End: 2024-06-12
Payer: COMMERCIAL

## 2024-06-12 VITALS
HEIGHT: 61 IN | DIASTOLIC BLOOD PRESSURE: 74 MMHG | SYSTOLIC BLOOD PRESSURE: 116 MMHG | WEIGHT: 144 LBS | BODY MASS INDEX: 27.19 KG/M2

## 2024-06-12 VITALS
HEIGHT: 61 IN | WEIGHT: 144 LBS | DIASTOLIC BLOOD PRESSURE: 74 MMHG | SYSTOLIC BLOOD PRESSURE: 116 MMHG | BODY MASS INDEX: 27.19 KG/M2

## 2024-06-12 DIAGNOSIS — Z91.89 AT HIGH RISK FOR BREAST CANCER: Primary | ICD-10-CM

## 2024-06-12 DIAGNOSIS — N60.99 ATYPICAL LOBULAR HYPERPLASIA (ALH) OF BREAST: Primary | ICD-10-CM

## 2024-06-12 PROCEDURE — 99024 POSTOP FOLLOW-UP VISIT: CPT | Performed by: STUDENT IN AN ORGANIZED HEALTH CARE EDUCATION/TRAINING PROGRAM

## 2024-06-12 NOTE — PROGRESS NOTES
GENERAL SURGERY BENIGN BREAST HISTORY AND PHYSICAL      SUMMARY:  Itzel Moses is a 58 y.o. lady with:  A diagnosis of left breast atypical lobular hyperplasia.  -Surgical plan: left breast wire localized excisional biopsy. Risks (including bleeding, infection, damage to surrounding structures, need for additional surgery), benefits and alternatives were discussed with the patient who agreed and wished to proceed. Risk of pathologic upgrade was also discussed with possible need for additional treatment.      High risk screening:   -Solo Warner lifetime breast cancer risk: 23%. This patient does qualify for  high risk screening.  -This patient does qualify be referred to medical oncology postoperatively pending final pathology for evaluation for prophylactic endocrine therapy due to high risk for breast cancer.  -Discussed recommendation to wean hormone replacement therapy.     Breast pain:  -Discussed vitamin E supplementation.     Family history of colon cancer:   -Last colonoscopy 2021. Due 2026.      Referring Provider: No ref. provider found     Chief complaint: breast mass     HPI: Ms. Itzel Moses is seen at the request of No ref. provider found. The patient is a 58 y.o. woman being seen for a new diagnosis of left breast atypical lobular hyperplasia.       She had a couple week of left breast pain. She is now having right breast pain as well.      She had a left excisional biopsy 20 years ago that was benign.      This was initially detected as an imaging abnormality on routine screening. Her work-up is detailed in the breast history section below. She has had regular annual mammogram. She denies any prior history of abnormal mammograms or breast biopsies. She denies any breast lumps, pain, skin changes, or nipple discharge.     She has a family history of breast cancer in her maternal grandmother (age 60s). She denies any family history of ovarian cancer.      TIMELINE OF WORKUP:  2/9/2023  bilateral screening mammogram:  There are scattered areas of fibroglandular density.  There is no mammographic evidence of malignancy.  BI-RADS Category 1     2/15/2024 bilateral diagnostic mammogram with left breast ultrasound:  There are scattered areas of fibroglandular density.  There is no radiographic abnormality in the region of the pain left breast at 1:00.  On ultrasound there is an oval parallel hypoechoic vascular mass with indistinct margins measuring 3 x 2 x 3 mm in the left breast at 1:00 4 cm from the nipple.  This is an incidental finding but located the area of concern.  Suspicious.  Ultrasound-guided biopsy is recommended.  BI-RADS Category 4B     2024 left breast ultrasound-guided biopsy:  The left breast at 1:00 was imaged and the mass was localized.  8 cores were obtained.  A mini cork tissue marker was placed.  Clip was in the expected position.  Pathology returned as focal ALH which is high risk and concordant.     2024 Pathology:   Final Diagnosis   1.  Breast, left 1 o'clock position, core biopsy:               A. Incidental focal atypical lobular hyperplasia  B. Duct ectasia and portions of cyst wall with columnar cell change      2024 Bilateral Breast MRI   IMPRESSION:  There are no findings suspicious for malignancy in either breast. Routine followup imaging is recommended.    BI-RADS category 1: Negative.     2024 Left breast wire localized excisional biopsy     MEDICAL HISTORY:   Gynecologic History:   . P:2 AB: 0  Age at first childbirth: 24  Lactation/How long: Yes  Age at menarche: 10  Age at menopause: Hysterectomy  Total years of oral contraceptive use: 15 years previously  Total years of hormone replacement therapy: 3 years, still currently on     Past Medical History:   GERD     Past Surgical History:    Hysterectomy   Carpal tunnel      Family History:    As above     Social History:   Denies tobacco use, quit over 10 years ago  Occasional alcohol use      Allergies:   Allergies   No Known Allergies         Medications:      Current Medications      Current Outpatient Medications:     cetirizine (zyrTEC) 10 MG tablet, Take 1 tablet by mouth Daily., Disp: , Rfl:     Collagen 500 MG capsule, Take  by mouth., Disp: , Rfl:     estradiol (VIVELLE-DOT) 0.0375 MG/24HR patch, 1 (ONE) PATCH TWO TIMES A WEEK, Disp: , Rfl:     Omega-3 Fatty Acids (FISH OIL) 1000 MG capsule capsule, Take 1 capsule by mouth Daily With Breakfast., Disp: , Rfl:     pantoprazole (PROTONIX) 40 MG EC tablet, TAKE 1 TABLET BY MOUTH 2 TIMES A DAY BEFORE MEALS., Disp: 180 tablet, Rfl: 1    sertraline (ZOLOFT) 50 MG tablet, Take 2 tablets by mouth Daily., Disp: , Rfl:     topiramate (TOPAMAX) 25 MG tablet, Take 3 tablets by mouth Daily., Disp: , Rfl:     TROKENDI XR 50 MG capsule sustained-release 24 hr, Take 1 capsule by mouth every night at bedtime., Disp: , Rfl:     TURMERIC PO, Take  by mouth., Disp: , Rfl:     Vitamin D, Cholecalciferol, 10 MCG (400 UNIT) capsule, Take  by mouth., Disp: , Rfl:          Labs:    Labs from 5/6/2024 reviewed     ROS:   Influenza-like illness: no fever, no  cough, no  sore throat, no  body aches, no loss of sense of taste or smell, no known exposure to person with Covid-19.  Constitutional: Negative for fevers or chills  HENT: Negative for hearing loss or runny nose  Eyes: Negative for vision changes or scleral icterus  Respiratory: Negative for cough or shortness of breath  Cardiovascular: Negative for chest pain or heart palpitations  Gastrointestinal: Negative for abdominal pain, nausea, vomiting, constipation, melena, or hematochezia  Genitourinary: Negative for hematuria or dysuria  Musculoskeletal: Negative for joint swelling or gait instability  Neurologic: Negative for tremors or seizures  Psychiatric: Negative for suicidal ideations or depression  All other systems reviewed and negative     PHYSICAL EXAM:   Constitutional: Well-developed well-nourished, no  acute distress  Eyes: Conjunctiva normal, sclera nonicteric  ENMT: Hearing grossly normal, oral mucosa moist  Neck: Supple, no palpable mass, trachea midline  Respiratory: Clear to auscultation, normal inspiratory effort  Cardiovascular: Regular rate, no murmur, no peripheral edema, no jugular venous distention  Breast: symmetric  Right: No visible abnormalities on inspection while seated, with arms raised or hands on hips. No masses, skin changes, or nipple abnormalities.  Left: No visible abnormalities on inspection while seated, with arms raised or hands on hips. No masses, skin changes, or nipple abnormalities.  No clinical chest wall involvement.  Gastrointestinal: Soft, nontender  Lymphatics (palpable nodes): No cervical, supraclavicular or axillary lymphadenopathy  Skin:  Warm, dry, no rash on visualized skin surfaces  Musculoskeletal: Symmetric strength, normal gait  Psychiatric: Alert and oriented ×3, normal affect     TREY GAGE M.D.  General and Endoscopic Surgery  [facility] [place]     [address], [address]  [place], [place], [address]  P: [phone]  F: [phone]

## 2024-06-13 DIAGNOSIS — Z91.89 AT HIGH RISK FOR BREAST CANCER: Primary | ICD-10-CM

## 2024-06-13 NOTE — PROGRESS NOTES
GENERAL SURGERY BENIGN BREAST HISTORY AND PHYSICAL      SUMMARY:  Itzel Moses is a 58 y.o. lady with:  A history of left breast atypical lobular hyperplasia.  -S/p left breast wire localized excisional biopsy May 2024.  -Annual mammogram due February 2025.  -Follow-up with Diana Abad PA-C March 2025.  -Annual mammogram due August 2025.     High risk screening:   -Solo Warner lifetime breast cancer risk: 23%. This patient does qualify for  high risk screening.  -This patient does qualify be referred to medical oncology for prophylactic endocrine therapy due to high risk for breast cancer.     Breast pain:  -Discussed vitamin E supplementation.     Family history of colon cancer:   -Last colonoscopy 2021. Due 2026.      Referring Provider: No ref. provider found     Chief complaint: breast mass     HPI: Ms. Itzel Moses is seen at the request of No ref. provider found. The patient is a 58 y.o. woman being seen for a new diagnosis of left breast atypical lobular hyperplasia.       She had a couple week of left breast pain. She is now having right breast pain as well.      She had a left excisional biopsy 20 years ago that was benign.      This was initially detected as an imaging abnormality on routine screening. Her work-up is detailed in the breast history section below. She has had regular annual mammogram. She denies any prior history of abnormal mammograms or breast biopsies. She denies any breast lumps, pain, skin changes, or nipple discharge.     She has a family history of breast cancer in her maternal grandmother (age 60s). She denies any family history of ovarian cancer.     6/12/2024 she presents today for follow-up.  She is done well since surgery with no concerns or complaints.     TIMELINE OF WORKUP:  2/9/2023 bilateral screening mammogram:  There are scattered areas of fibroglandular density.  There is no mammographic evidence of malignancy.  BI-RADS Category 1     2/15/2024  bilateral diagnostic mammogram with left breast ultrasound:  There are scattered areas of fibroglandular density.  There is no radiographic abnormality in the region of the pain left breast at 1:00.  On ultrasound there is an oval parallel hypoechoic vascular mass with indistinct margins measuring 3 x 2 x 3 mm in the left breast at 1:00 4 cm from the nipple.  This is an incidental finding but located the area of concern.  Suspicious.  Ultrasound-guided biopsy is recommended.  BI-RADS Category 4B     2024 left breast ultrasound-guided biopsy:  The left breast at 1:00 was imaged and the mass was localized.  8 cores were obtained.  A mini cork tissue marker was placed.  Clip was in the expected position.  Pathology returned as focal ALH which is high risk and concordant.     2024 Pathology:   Final Diagnosis   1.  Breast, left 1 o'clock position, core biopsy:               A. Incidental focal atypical lobular hyperplasia  B. Duct ectasia and portions of cyst wall with columnar cell change      2024 Bilateral Breast MRI   IMPRESSION:  There are no findings suspicious for malignancy in either breast. Routine followup imaging is recommended.    BI-RADS category 1: Negative.     2024 Left breast wire localized excisional biopsy   Final Diagnosis   1.  Left breast, oriented excisional biopsy/lumpectomy:               A.  Sclerosing adenosis, fibrocystic change with clustered ductal microcysts, focal duct ectasia and focal fibroadenomatous hyperplasia.               B.  No atypical hyperplasia, carcinoma in-situ nor invasive carcinoma identified.               C.  Biopsy site changes are identified, and metallic clip retrieved.               D.  Rare microcalcification identified within benign breast tissue.               E.  Surgical margins are viable and negative for neoplasm/malignancy.     MEDICAL HISTORY:   Gynecologic History:   . P:2 AB: 0  Age at first childbirth: 24  Lactation/How long:  Yes  Age at menarche: 10  Age at menopause: Hysterectomy  Total years of oral contraceptive use: 15 years previously  Total years of hormone replacement therapy: 3 years, still currently on     Past Medical History:   GERD     Past Surgical History:    Hysterectomy   Carpal tunnel      Family History:    As above     Social History:   Denies tobacco use, quit over 10 years ago  Occasional alcohol use     Allergies:   Allergies   No Known Allergies         Medications:      Current Medications      Current Outpatient Medications:     cetirizine (zyrTEC) 10 MG tablet, Take 1 tablet by mouth Daily., Disp: , Rfl:     Collagen 500 MG capsule, Take  by mouth., Disp: , Rfl:     estradiol (VIVELLE-DOT) 0.0375 MG/24HR patch, 1 (ONE) PATCH TWO TIMES A WEEK, Disp: , Rfl:     Omega-3 Fatty Acids (FISH OIL) 1000 MG capsule capsule, Take 1 capsule by mouth Daily With Breakfast., Disp: , Rfl:     pantoprazole (PROTONIX) 40 MG EC tablet, TAKE 1 TABLET BY MOUTH 2 TIMES A DAY BEFORE MEALS., Disp: 180 tablet, Rfl: 1    sertraline (ZOLOFT) 50 MG tablet, Take 2 tablets by mouth Daily., Disp: , Rfl:     topiramate (TOPAMAX) 25 MG tablet, Take 3 tablets by mouth Daily., Disp: , Rfl:     TROKENDI XR 50 MG capsule sustained-release 24 hr, Take 1 capsule by mouth every night at bedtime., Disp: , Rfl:     TURMERIC PO, Take  by mouth., Disp: , Rfl:     Vitamin D, Cholecalciferol, 10 MCG (400 UNIT) capsule, Take  by mouth., Disp: , Rfl:          Labs:    Labs from 5/6/2024 reviewed     ROS:   Influenza-like illness: no fever, no  cough, no  sore throat, no  body aches, no loss of sense of taste or smell, no known exposure to person with Covid-19.  Constitutional: Negative for fevers or chills  HENT: Negative for hearing loss or runny nose  Eyes: Negative for vision changes or scleral icterus  Respiratory: Negative for cough or shortness of breath  Cardiovascular: Negative for chest pain or heart palpitations  Gastrointestinal: Negative for  abdominal pain, nausea, vomiting, constipation, melena, or hematochezia  Genitourinary: Negative for hematuria or dysuria  Musculoskeletal: Negative for joint swelling or gait instability  Neurologic: Negative for tremors or seizures  Psychiatric: Negative for suicidal ideations or depression  All other systems reviewed and negative     PHYSICAL EXAM:   Constitutional: Well-developed well-nourished, no acute distress  Eyes: Conjunctiva normal, sclera nonicteric  ENMT: Hearing grossly normal, oral mucosa moist  Neck: Supple, no palpable mass, trachea midline  Respiratory: Clear to auscultation, normal inspiratory effort  Cardiovascular: Regular rate, no murmur, no peripheral edema, no jugular venous distention  Breast: symmetric  Right: No visible abnormalities on inspection while seated, with arms raised or hands on hips. No masses, skin changes, or nipple abnormalities.   Left: No visible abnormalities on inspection while seated, with arms raised or hands on hips. No masses, skin changes, or nipple abnormalities. Left breast incision clean and dry with no erythema or drainage, no hematoma or seroma.  No clinical chest wall involvement.  Gastrointestinal: Soft, nontender  Lymphatics (palpable nodes): No cervical, supraclavicular or axillary lymphadenopathy  Skin:  Warm, dry, no rash on visualized skin surfaces  Musculoskeletal: Symmetric strength, normal gait  Psychiatric: Alert and oriented ×3, normal affect      TREY GAGE M.D.  General and Endoscopic Surgery  [facility]     [address], [address]  [place], [place], [address]  P: [phone]  F: [phone]

## 2024-08-05 ENCOUNTER — CONSULT (OUTPATIENT)
Dept: ONCOLOGY | Facility: CLINIC | Age: 59
End: 2024-08-05
Payer: COMMERCIAL

## 2024-08-05 ENCOUNTER — LAB (OUTPATIENT)
Dept: OTHER | Facility: HOSPITAL | Age: 59
End: 2024-08-05
Payer: COMMERCIAL

## 2024-08-05 VITALS
WEIGHT: 147.3 LBS | DIASTOLIC BLOOD PRESSURE: 92 MMHG | RESPIRATION RATE: 16 BRPM | SYSTOLIC BLOOD PRESSURE: 136 MMHG | HEART RATE: 64 BPM | OXYGEN SATURATION: 95 % | BODY MASS INDEX: 27.81 KG/M2 | HEIGHT: 61 IN | TEMPERATURE: 97.8 F

## 2024-08-05 DIAGNOSIS — Z91.89 AT HIGH RISK FOR BREAST CANCER: Primary | ICD-10-CM

## 2024-08-05 DIAGNOSIS — N60.99 ATYPICAL LOBULAR HYPERPLASIA (ALH) OF BREAST: ICD-10-CM

## 2024-08-05 DIAGNOSIS — N60.99 ATYPICAL LOBULAR HYPERPLASIA (ALH) OF BREAST: Primary | ICD-10-CM

## 2024-08-05 LAB
BASOPHILS # BLD AUTO: 0.07 10*3/MM3 (ref 0–0.2)
BASOPHILS NFR BLD AUTO: 1.5 % (ref 0–1.5)
DEPRECATED RDW RBC AUTO: 44 FL (ref 37–54)
EOSINOPHIL # BLD AUTO: 0.59 10*3/MM3 (ref 0–0.4)
EOSINOPHIL NFR BLD AUTO: 12.9 % (ref 0.3–6.2)
ERYTHROCYTE [DISTWIDTH] IN BLOOD BY AUTOMATED COUNT: 11.8 % (ref 12.3–15.4)
HCT VFR BLD AUTO: 39.9 % (ref 34–46.6)
HGB BLD-MCNC: 13.1 G/DL (ref 12–15.9)
IMM GRANULOCYTES # BLD AUTO: 0 10*3/MM3 (ref 0–0.05)
IMM GRANULOCYTES NFR BLD AUTO: 0 % (ref 0–0.5)
LYMPHOCYTES # BLD AUTO: 1.94 10*3/MM3 (ref 0.7–3.1)
LYMPHOCYTES NFR BLD AUTO: 42.3 % (ref 19.6–45.3)
MCH RBC QN AUTO: 33.3 PG (ref 26.6–33)
MCHC RBC AUTO-ENTMCNC: 32.8 G/DL (ref 31.5–35.7)
MCV RBC AUTO: 101.5 FL (ref 79–97)
MONOCYTES # BLD AUTO: 0.3 10*3/MM3 (ref 0.1–0.9)
MONOCYTES NFR BLD AUTO: 6.5 % (ref 5–12)
NEUTROPHILS NFR BLD AUTO: 1.69 10*3/MM3 (ref 1.7–7)
NEUTROPHILS NFR BLD AUTO: 36.8 % (ref 42.7–76)
NRBC BLD AUTO-RTO: 0 /100 WBC (ref 0–0.2)
PLATELET # BLD AUTO: 222 10*3/MM3 (ref 140–450)
PMV BLD AUTO: 12 FL (ref 6–12)
RBC # BLD AUTO: 3.93 10*6/MM3 (ref 3.77–5.28)
WBC NRBC COR # BLD AUTO: 4.59 10*3/MM3 (ref 3.4–10.8)

## 2024-08-05 PROCEDURE — 36415 COLL VENOUS BLD VENIPUNCTURE: CPT

## 2024-08-05 PROCEDURE — 85025 COMPLETE CBC W/AUTO DIFF WBC: CPT | Performed by: INTERNAL MEDICINE

## 2024-08-05 PROCEDURE — 99204 OFFICE O/P NEW MOD 45 MIN: CPT | Performed by: INTERNAL MEDICINE

## 2024-08-05 RX ORDER — SUCRALFATE 1 G/1
1 TABLET ORAL
COMMUNITY
Start: 2024-07-20

## 2024-08-05 RX ORDER — RALOXIFENE HYDROCHLORIDE 60 MG/1
60 TABLET, FILM COATED ORAL DAILY
Qty: 90 TABLET | Refills: 3 | Status: SHIPPED | OUTPATIENT
Start: 2024-08-05

## 2024-08-05 NOTE — PROGRESS NOTES
Subjective     REASON FOR CONSULTATION: Typical lobular hyperplasia left breast  Provide an opinion on any further workup or treatment                             REQUESTING PHYSICIAN: MD Kecia Davis MD    RECORDS OBTAINED:  Records of the patients history including those obtained from the referring provider were reviewed and summarized in detail.    HISTORY OF PRESENT ILLNESS:  The patient is a 58 y.o. year old female who is here for an opinion about the above issue.    History of Present Illness patient is a 58-year-old female with no chronic medical problems who felt a mass in her left breast was sent for imaging which revealed an avascular mass measuring 3 x 3 mm in the left breast 1:00 this was biopsied and showed incidental ALH.  She proceeded to have excision of this area which showed no residual atypia and was referred here to consider prevention    Medically was at age 11 she had a hysterectomy at age 55 with heavy bleeding since then has had menopausal symptoms but no oophorectomy was done.  She started hormone therapy with an estrogen patch and discontinued with her recent biopsy  She is  2 para 2 first childbirth with a 27 she did not breast-feed significantly    Family history is positive for mother dying of colon cancer in her 60s father had colon cancer in his 80s prostate cancer and melanoma, maternal grandmother breast cancer in her 60s -she has not had genetic testing and I ordered it for her    She stopped smoking 30 years ago she drinks 4-5 mixed drinks a week    She has never had a DVT MI or stroke    Her most recent bone density showed osteopenia    Past Medical History:   Diagnosis Date    Allergic conjunctivitis of right eye 2016    SEEN AT [facility]    Allergic rhinitis     Anxiety     Arthritis     Asthma     Breast mass     [facility] arthritis 2012    Colon polyp     Depression     Duodenitis 2020    Dyspepsia      Environmental allergies     Erosive gastritis 05/2020    Fibrocystic breast     GERD (gastroesophageal reflux disease)     Hammertoe of right foot 06/2018    Hemorrhoids     Hiatal hernia     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    Migraines     Seasonal allergies         Past Surgical History:   Procedure Laterality Date    APPENDECTOMY      BLEPHAROPLASTY Bilateral     BREAST BIOPSY      BREAST LUMPECTOMY  2005    BREAST LUMPECTOMY Left 05/24/2024    Procedure: left breast needle-localized excisional biopsy;  Surgeon: Marlene Dooley MD;  Location: [facility] OR [facility];  Service: General;  Laterality: Left;    CARPAL TUNNEL RELEASE Bilateral     COLONOSCOPY N/A 2015    normal per patient, NO RECORDS, DONE IN [place]    COLONOSCOPY N/A 10/25/2019    A FEW SMALL DIVERTICULA IN SIGMOID, GRADE 2 HEMORRHOIDS, A PATCHY AREA OF ATROPHIC AND GRANULAR MUCOSA IN TERMINAL ILEUM, RESCOPE IN 5 YRS, DR. MERLY DUQUE AT [facility]    ENDOSCOPY N/A 10/25/2019    EROSIVE GASTRITIS, DUODENITIS, DR. MERLY DUQUE AT [facility]    ENDOSCOPY N/A 11/01/2021    Procedure: ESOPHAGOGASTRODUODENOSCOPY WITH BX'S;  Surgeon: Merly Duque MD;  Location: [facility] ENDOSCOPY;  Service: Gastroenterology;  Laterality: N/A;  pre: ABDOMINAL PAIN, NAUSEA  post: GASTRITIS    HYSTERECTOMY  2022    KNEE SURGERY Left 2010    ACL REPAIR    OVARIAN CYST REMOVAL  1988        Current Outpatient Medications on File Prior to Visit   Medication Sig Dispense Refill    B Complex Vitamins (VITAMIN-B COMPLEX PO) Take  by mouth.      Calcium Magnesium Zinc 333-133-5 MG tablet Take 1 tablet by mouth Daily.      cetirizine (zyrTEC) 10 MG tablet Take 1 tablet by mouth Daily.      naproxen sodium (ALEVE) 220 MG tablet Take 2 tablets by mouth 2 (Two) Times a Day As Needed for Mild Pain, Fever or Headache.      Omega-3 Fatty Acids (FISH OIL) 1000 MG capsule capsule Take 1 capsule by mouth Daily  With Breakfast.      pantoprazole (PROTONIX) 40 MG EC tablet TAKE 1 TABLET BY MOUTH 2 TIMES A DAY BEFORE MEALS. 180 tablet 0    sertraline (ZOLOFT) 50 MG tablet Take 2 tablets by mouth Daily.      TURMERIC PO Take 2 capsules by mouth Daily.      Vitamin D, Cholecalciferol, 25 MCG (1000 UT) capsule Take 1 capsule by mouth Daily.      sucralfate (CARAFATE) 1 g tablet 1 tablet. (Patient not taking: Reported on 8/5/2024)       No current facility-administered medications on file prior to visit.        ALLERGIES:  No Known Allergies     Social History     Socioeconomic History    Marital status:    Tobacco Use    Smoking status: Former     Current packs/day: 0.00     Types: Cigarettes     Quit date: 2009     Years since quitting: 15.6    Smokeless tobacco: Never    Tobacco comments:     Smoked 1/2 pack week  for 15 years   Vaping Use    Vaping status: Never Used   Substance and Sexual Activity    Alcohol use: Yes     Alcohol/week: 6.0 standard drinks of alcohol     Types: 3 Glasses of wine, 3 Shots of liquor per week    Drug use: Never    Sexual activity: Yes     Partners: Male     Birth control/protection: Post-menopausal        Family History   Problem Relation Age of Onset    Liver cancer Mother     Colon cancer Mother     Colon polyps Mother     Alcohol abuse Mother     Arthritis Mother     Cancer Mother     Prostate cancer Father     Colon cancer Father     Heart attack Father     Melanoma Father     Cancer Father     Colon polyps Father     Heart disease Father     Hypertension Father     Cancer Sister     No Known Problems Maternal Aunt     Breast cancer Maternal Grandmother     Cancer Brother     Malig Hyperthermia Neg Hx         Review of Systems   Constitutional:  Positive for diaphoresis.   Endocrine: Positive for heat intolerance.   All other systems reviewed and are negative.   hot flashes    Objective     Vitals:    08/05/24 1005   BP: 136/92   Pulse: 64   Resp: 16   Temp: 97.8 °F (36.6 °C)  "  TempSrc: Oral   SpO2: 95%   Weight: 66.8 kg (147 lb 4.8 oz)   Height: 155 cm (61.02\")   PainSc: 0-No pain         8/5/2024    10:06 AM   Current Status   ECOG score 0       Physical Exam        CONSTITUTIONAL:  Vital signs reviewed.  No distress, looks comfortable.  EYES:  Conjunctivae and lids unremarkable.  PERRLA  EARS,NOSE,MOUTH,THROAT:  Ears and nose appear unremarkable.  Lips, teeth, gums appear unremarkable.  RESPIRATORY:  Normal respiratory effort.  Lungs clear to auscultation bilaterally.  BREASTS: Breasts are benign with a well-healed scar at the areolar margin on the left  CARDIOVASCULAR:  Normal S1, S2.  No murmurs rubs or gallops.  No significant lower extremity edema.  GASTROINTESTINAL: Abdomen appears unremarkable.  Nontender.  No hepatomegaly.  No splenomegaly.  LYMPHATIC:  No cervical, supraclavicular, axillary lymphadenopathy.  SKIN:  Warm.  No rashes.  PSYCHIATRIC:  Normal judgment and insight.  Normal mood and affect.      RECENT LABS:  Hematology WBC   Date Value Ref Range Status   08/05/2024 4.59 3.40 - 10.80 10*3/mm3 Final   04/26/2022 4.0 3.4 - 10.8 x10E3/uL Final     RBC   Date Value Ref Range Status   08/05/2024 3.93 3.77 - 5.28 10*6/mm3 Final   04/26/2022 4.07 3.77 - 5.28 x10E6/uL Final     Hemoglobin   Date Value Ref Range Status   08/05/2024 13.1 12.0 - 15.9 g/dL Final     Hematocrit   Date Value Ref Range Status   08/05/2024 39.9 34.0 - 46.6 % Final     Platelets   Date Value Ref Range Status   08/05/2024 222 140 - 450 10*3/mm3 Final            inal Diagnosis 2/29/24   1.  Breast, left 1 o'clock position, core biopsy:               A. Incidental focal atypical lobular hyperplasia  B. Duct ectasia and portions of cyst wall with columnar cell change.   Electronically signed by Fiona Mcqueen MD     Final Diagnosis 5/24/24  1. Left breast, oriented excisional biopsy/lumpectomy:  A. Sclerosing adenosis, fibrocystic change with clustered ductal microcysts, focal duct ectasia and " focal  fibroadenomatous hyperplasia.  B. No atypical hyperplasia, carcinoma in-situ nor invasive carcinoma identified.  C. Biopsy site changes are identified, and metallic clip retrieved.  D. Rare microcalcification identified within benign breast tissue.  E. Surgical margins are viable and negative for neoplasm/malignancy.  Electronically signed by Suzi    Assessment & Plan     1.ALH incidental left breast-patient did palpate a lump in this area-lifetime risk of 35% by the Tyrer-Cuzick model 15% 10-year    2.  Family history of colon breast cancer genetic testing ordered    3.  Menopausal with hot flashes-wants to hold off on adding Veozah at this time    4.  Osteopenia    Plan  We discussed the natural history of ALH and risk of breast cancer in her lifetime which is significant.  We discussed using prevention and based on her bone density issues I suggested Evista and she is agreeable but wants to think about it for couple weeks    Will review her bone density and her genetic testing    Will see her back in 4 to 5 months to assess her tolerance of the Evista if she takes it and if her genetic testing is positive and additional screening test for colon cancer will be added    We discussed the fact that prevention would increase risk of invasive or noninvasive cancer by 40 to 50% but would not affect survival  I spent 45 total minutes, face-to-face, caring for Itzel today. Greater than 50% of this time involved counseling and/or coordination of care as documented within this note.

## 2024-08-06 ENCOUNTER — PATIENT ROUNDING (BHMG ONLY) (OUTPATIENT)
Dept: ONCOLOGY | Facility: CLINIC | Age: 59
End: 2024-08-06
Payer: COMMERCIAL

## 2024-08-06 NOTE — PROGRESS NOTES
A My-Chart message has been sent to the patient for PATIENT ROUNDING with [facility].

## 2024-09-12 RX ORDER — PANTOPRAZOLE SODIUM 40 MG/1
40 TABLET, DELAYED RELEASE ORAL
Qty: 180 TABLET | Refills: 0 | Status: SHIPPED | OUTPATIENT
Start: 2024-09-12

## 2024-11-06 ENCOUNTER — OFFICE VISIT (OUTPATIENT)
Dept: GASTROENTEROLOGY | Facility: CLINIC | Age: 59
End: 2024-11-06
Payer: COMMERCIAL

## 2024-11-06 VITALS
BODY MASS INDEX: 27.09 KG/M2 | DIASTOLIC BLOOD PRESSURE: 80 MMHG | SYSTOLIC BLOOD PRESSURE: 108 MMHG | HEIGHT: 62 IN | HEART RATE: 83 BPM | WEIGHT: 147.2 LBS

## 2024-11-06 DIAGNOSIS — R13.10 DYSPHAGIA, UNSPECIFIED TYPE: ICD-10-CM

## 2024-11-06 DIAGNOSIS — Z12.11 ENCOUNTER FOR SCREENING FOR MALIGNANT NEOPLASM OF COLON: ICD-10-CM

## 2024-11-06 DIAGNOSIS — K21.9 GERD WITHOUT ESOPHAGITIS: Primary | ICD-10-CM

## 2024-11-06 PROCEDURE — 99214 OFFICE O/P EST MOD 30 MIN: CPT

## 2024-11-06 RX ORDER — LANSOPRAZOLE 30 MG/1
30 CAPSULE, DELAYED RELEASE ORAL
Qty: 180 CAPSULE | Refills: 3 | Status: SHIPPED | OUTPATIENT
Start: 2024-11-06

## 2024-11-06 NOTE — PROGRESS NOTES
"Chief Complaint  Nausea, Abdominal Pain, and Diarrhea    Subjective          History of Present Illness    Itzel Moses is a  59 y.o. female presents for GERD, constipation.  She is a patient of Dr. Duque and is new to me.  Last seen October 2023.    Reports about 1 month ago she started having continuous pains in stomach.  Back in July she woke up in the middle of the night with chest pain.  She went to the hospital and underwent cardiac workup which was unremarkable and it was felt that her symptoms were GI in nature.  She was given sucralfate which she took for a little while.  Sucre fate did help symptoms but they have never fully gone away.  They acutely worsened a month ago now she is having consistent abdominal discomfort reflux, bloating.  She denies vomiting.  She did have some black stool but noted that she had taken Pepto-Bismol around this time.  No bloody stool.  She does report that she has frequent loose bowel movements.  No difficulty swallowing.    CT 4/22 - hepatic steatosis, constipation.  She reports difficulty losing weight.     Mom-CRC w/liver mets; Father-CRC Next colonoscopy 2024    Colonoscopy 10/2019 showed diverticulosis, nonbleeding internal hemorrhoids, atrophic and granular mucosa in terminal ileum, otherwise normal exam.  Repeat colonoscopy 5 years.    EGD 11/2021 showed normal esophagus, erythematous mucosa in gastric body and antrum which was biopsied, normal examined duodenum.  Small bowel biopsies normal.    Objective   Vital Signs:   /80 (BP Location: Left arm, Patient Position: Sitting, Cuff Size: Adult)   Pulse 83   Ht 157.5 cm (62\")   Wt 66.8 kg (147 lb 3.2 oz)   BMI 26.92 kg/m²       Physical Exam  Constitutional:       General: She is not in acute distress.     Appearance: Normal appearance.   Eyes:      General: No scleral icterus.  Pulmonary:      Effort: Pulmonary effort is normal.   Abdominal:      General: Abdomen is flat. There is no distension.      " Tenderness: There is abdominal tenderness (Mild epigastric tenderness to palpation). There is no guarding.   Skin:     Coloration: Skin is not jaundiced.   Neurological:      General: No focal deficit present.      Mental Status: She is alert and oriented to person, place, and time.   Psychiatric:         Mood and Affect: Mood normal.         Behavior: Behavior normal.          Result Review :   The following data was reviewed by: Caprice Preciado PA-C on 11/06/2024:  CMP          5/6/2024    13:37   CMP   Glucose 88    BUN 10    Creatinine 0.82    EGFR 82.5    Sodium 143    Potassium 4.6    Chloride 108    Calcium 9.4    BUN/Creatinine Ratio 12.2    Anion Gap 8.0      CBC          5/6/2024    13:37 8/5/2024    09:58   CBC   WBC 4.57  4.59    RBC 3.80  3.93    Hemoglobin 12.7  13.1    Hematocrit 38.4  39.9    .1  101.5    MCH 33.4  33.3    MCHC 33.1  32.8    RDW 11.5  11.8    Platelets 211  222              Assessment:   Diagnoses and all orders for this visit:    1. GERD without esophagitis (Primary)  -     lansoprazole (PREVACID) 30 MG capsule; Take 1 capsule by mouth 2 (Two) Times a Day Before Meals.  Dispense: 180 capsule; Refill: 3  -     Case Request; Standing  -     Case Request    2. Dysphagia, unspecified type  -     Case Request; Standing  -     Case Request    3. Encounter for screening for malignant neoplasm of colon  -     Case Request; Standing  -     Case Request    Other orders  -     Implement Anesthesia orders day of procedure.; Standing  -     Follow Anesthesia Guidelines / Protocol; Future  -     Verify bowel prep was successful; Standing  -     Give tap water enema if bowel prep was insufficient; Standing          Plan:   -She has been on pantoprazole for quite some time-recommend she stop this and I will send in lansoprazole for her to take twice daily instead  -Advised GERD precautions-stick to bland diet and avoid spicy food, acidic food, alcohol  -She is due for screening colonoscopy  given family history of colon cancer in her mother and father.  Will add EGD on as well given ongoing abdominal discomfort.  If EGD is unremarkable and symptoms continue would recommend CT scan      Follow Up   No follow-ups on file.    Dragon dictation used throughout this note.         Caprice Preciado PA-C  [facility]  [address] [place] [address]  Office: [phone]

## 2024-12-04 ENCOUNTER — TELEPHONE (OUTPATIENT)
Dept: ONCOLOGY | Facility: CLINIC | Age: 59
End: 2024-12-04
Payer: COMMERCIAL

## 2024-12-04 NOTE — TELEPHONE ENCOUNTER
Called patient, reviewed her cbc with her and let her know her genetic testing hasn't resulted yet.

## 2024-12-04 NOTE — TELEPHONE ENCOUNTER
"    Caller: Itzel Moses \"Pam\"    Relationship: Self    Best call back number: [phone]    What test was performed: LAB RESULTS    When was the test performed: 8/5/24    Where was the test performed: [facility]  "

## 2024-12-06 ENCOUNTER — OUTSIDE FACILITY SERVICE (OUTPATIENT)
Dept: GASTROENTEROLOGY | Facility: CLINIC | Age: 59
End: 2024-12-06
Payer: COMMERCIAL

## 2024-12-06 ENCOUNTER — LAB REQUISITION (OUTPATIENT)
Dept: LAB | Facility: HOSPITAL | Age: 59
End: 2024-12-06
Payer: COMMERCIAL

## 2024-12-06 DIAGNOSIS — Z12.11 ENCOUNTER FOR SCREENING COLONOSCOPY: ICD-10-CM

## 2024-12-06 PROCEDURE — 88305 TISSUE EXAM BY PATHOLOGIST: CPT | Performed by: INTERNAL MEDICINE

## 2024-12-09 LAB
CYTO UR: NORMAL
LAB AP CASE REPORT: NORMAL
PATH REPORT.FINAL DX SPEC: NORMAL
PATH REPORT.GROSS SPEC: NORMAL

## 2024-12-11 NOTE — PROGRESS NOTES
Mild nonspecific inflammation seen on stomach biopsy.  Continue lansoprazole.  Recommend diet and lifestyle modification to reduce acid reflux symptoms such as eating small meals, reducing fat caffeine and alcohol in the diet, avoid eating close to bedtime, eliminate excess weight if applicable.    Repeat colonoscopy in 5 years for screening

## 2024-12-12 ENCOUNTER — TELEPHONE (OUTPATIENT)
Dept: GASTROENTEROLOGY | Facility: CLINIC | Age: 59
End: 2024-12-12
Payer: COMMERCIAL

## 2024-12-12 NOTE — TELEPHONE ENCOUNTER
Mild nonspecific inflammation seen on stomach biopsy.  Continue lansoprazole.  Recommend diet and lifestyle modification to reduce acid reflux symptoms such as eating small meals, reducing fat caffeine and alcohol in the diet, avoid eating close to bedtime, eliminate excess weight if applicable.     Repeat colonoscopy in 5 years for screening   Written by Merly Duque MD on 12/11/2024  2:12 PM EST  Seen by patient Pam [email or web address] on 12/11/2024  7:44 PM    C/s placed in recall and  for 12/06/2029.

## 2024-12-16 RX ORDER — PANTOPRAZOLE SODIUM 40 MG/1
40 TABLET, DELAYED RELEASE ORAL
Qty: 180 TABLET | Refills: 0 | OUTPATIENT
Start: 2024-12-16

## 2025-02-17 ENCOUNTER — OFFICE VISIT (OUTPATIENT)
Dept: ONCOLOGY | Facility: CLINIC | Age: 60
End: 2025-02-17
Payer: COMMERCIAL

## 2025-02-17 ENCOUNTER — LAB (OUTPATIENT)
Dept: OTHER | Facility: HOSPITAL | Age: 60
End: 2025-02-17
Payer: COMMERCIAL

## 2025-02-17 VITALS
BODY MASS INDEX: 28.06 KG/M2 | RESPIRATION RATE: 16 BRPM | TEMPERATURE: 97.4 F | WEIGHT: 152.5 LBS | HEIGHT: 62 IN | HEART RATE: 68 BPM | OXYGEN SATURATION: 97 % | DIASTOLIC BLOOD PRESSURE: 81 MMHG | SYSTOLIC BLOOD PRESSURE: 116 MMHG

## 2025-02-17 DIAGNOSIS — N60.99 ATYPICAL LOBULAR HYPERPLASIA (ALH) OF BREAST: ICD-10-CM

## 2025-02-17 DIAGNOSIS — N60.99 ATYPICAL LOBULAR HYPERPLASIA (ALH) OF BREAST: Primary | ICD-10-CM

## 2025-02-17 LAB
ALBUMIN SERPL-MCNC: 4.4 G/DL (ref 3.5–5.2)
ALBUMIN/GLOB SERPL: 1.3 G/DL
ALP SERPL-CCNC: 67 U/L (ref 39–117)
ALT SERPL W P-5'-P-CCNC: 18 U/L (ref 1–33)
ANION GAP SERPL CALCULATED.3IONS-SCNC: 9.3 MMOL/L (ref 5–15)
AST SERPL-CCNC: 24 U/L (ref 1–32)
BASOPHILS # BLD AUTO: 0.05 10*3/MM3 (ref 0–0.2)
BASOPHILS NFR BLD AUTO: 1.2 % (ref 0–1.5)
BILIRUB SERPL-MCNC: 0.4 MG/DL (ref 0–1.2)
BUN SERPL-MCNC: 14 MG/DL (ref 8–23)
BUN/CREAT SERPL: 17.5 (ref 7–25)
CALCIUM SPEC-SCNC: 10 MG/DL (ref 8.6–10.5)
CHLORIDE SERPL-SCNC: 104 MMOL/L (ref 98–107)
CO2 SERPL-SCNC: 25.7 MMOL/L (ref 22–29)
CREAT SERPL-MCNC: 0.8 MG/DL (ref 0.57–1)
DEPRECATED RDW RBC AUTO: 44.1 FL (ref 37–54)
EGFRCR SERPLBLD CKD-EPI 2021: 84.5 ML/MIN/1.73
EOSINOPHIL # BLD AUTO: 0.28 10*3/MM3 (ref 0–0.4)
EOSINOPHIL NFR BLD AUTO: 6.9 % (ref 0.3–6.2)
ERYTHROCYTE [DISTWIDTH] IN BLOOD BY AUTOMATED COUNT: 11.9 % (ref 12.3–15.4)
GLOBULIN UR ELPH-MCNC: 3.5 GM/DL
GLUCOSE SERPL-MCNC: 108 MG/DL (ref 65–99)
HCT VFR BLD AUTO: 42.7 % (ref 34–46.6)
HGB BLD-MCNC: 14.1 G/DL (ref 12–15.9)
IMM GRANULOCYTES # BLD AUTO: 0 10*3/MM3 (ref 0–0.05)
IMM GRANULOCYTES NFR BLD AUTO: 0 % (ref 0–0.5)
LYMPHOCYTES # BLD AUTO: 1.34 10*3/MM3 (ref 0.7–3.1)
LYMPHOCYTES NFR BLD AUTO: 32.8 % (ref 19.6–45.3)
MCH RBC QN AUTO: 33.3 PG (ref 26.6–33)
MCHC RBC AUTO-ENTMCNC: 33 G/DL (ref 31.5–35.7)
MCV RBC AUTO: 100.7 FL (ref 79–97)
MONOCYTES # BLD AUTO: 0.37 10*3/MM3 (ref 0.1–0.9)
MONOCYTES NFR BLD AUTO: 9.1 % (ref 5–12)
NEUTROPHILS NFR BLD AUTO: 2.04 10*3/MM3 (ref 1.7–7)
NEUTROPHILS NFR BLD AUTO: 50 % (ref 42.7–76)
NRBC BLD AUTO-RTO: 0 /100 WBC (ref 0–0.2)
PLATELET # BLD AUTO: 196 10*3/MM3 (ref 140–450)
PMV BLD AUTO: 12 FL (ref 6–12)
POTASSIUM SERPL-SCNC: 4.6 MMOL/L (ref 3.5–5.2)
PROT SERPL-MCNC: 7.9 G/DL (ref 6–8.5)
RBC # BLD AUTO: 4.24 10*6/MM3 (ref 3.77–5.28)
SODIUM SERPL-SCNC: 139 MMOL/L (ref 136–145)
WBC NRBC COR # BLD AUTO: 4.08 10*3/MM3 (ref 3.4–10.8)

## 2025-02-17 PROCEDURE — 80053 COMPREHEN METABOLIC PANEL: CPT | Performed by: INTERNAL MEDICINE

## 2025-02-17 PROCEDURE — 36415 COLL VENOUS BLD VENIPUNCTURE: CPT

## 2025-02-17 PROCEDURE — 85025 COMPLETE CBC W/AUTO DIFF WBC: CPT | Performed by: INTERNAL MEDICINE

## 2025-02-17 RX ORDER — RALOXIFENE HYDROCHLORIDE 60 MG/1
1 TABLET, FILM COATED ORAL DAILY
COMMUNITY
Start: 2025-02-02

## 2025-02-17 NOTE — PROGRESS NOTES
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 Merly Castro her gastroenterologist in order to make a decision about frequency of endoscopy knowing that some of the VUSs can be reclassified as pathogenic over time    She is otherwise doing well with no new complaints and is due for her mammogram soon and she is asking about whether to add an MRI and I told her that she could discuss this with Dr. Dooley at her visit next month and I have scheduled a mammogram    Past Medical History:   Diagnosis Date    Allergic conjunctivitis of right eye 06/25/2016    SEEN AT [facility]    Allergic rhinitis     Anemia     Anxiety     Arthritis     Asthma     Breast mass     CMC arthritis 12/2012    Colon polyp     Depression     Duodenitis 05/2020    Dyspepsia     Environmental allergies     Erosive gastritis 05/2020    Fibrocystic breast     GERD (gastroesophageal reflux disease)     Hammertoe of right foot 06/2018    Hemorrhoids     Hiatal hernia     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 [place] [facility];  Service: General;  Laterality: Left;    CARPAL TUNNEL RELEASE Bilateral     COLONOSCOPY N/A     normal per patient, NO RECORDS, DONE IN [place]    COLONOSCOPY N/A 10/25/2019    A FEW SMALL DIVERTICULA IN SIGMOID, GRADE 2 HEMORRHOIDS, A PATCHY AREA OF ATROPHIC AND GRANULAR MUCOSA IN TERMINAL ILEUM, RESCOPE IN 5 YRS, DR. MERLY DUQUE AT [facility]    ENDOSCOPY N/A 10/25/2019    EROSIVE GASTRITIS, DUODENITIS, DR. MERLY DUQUE AT [facility]    ENDOSCOPY N/A 2021    Procedure: ESOPHAGOGASTRODUODENOSCOPY WITH BX'S;  Surgeon: Merly Duque MD;  Location: [facility] ENDOSCOPY;  Service: Gastroenterology;  Laterality: N/A;  pre: ABDOMINAL PAIN, NAUSEA  post: GASTRITIS    FLEXIBLE SIGMOIDOSCOPY      HYSTERECTOMY      KNEE SURGERY Left     ACL REPAIR    OVARIAN CYST REMOVAL      UPPER GASTROINTESTINAL ENDOSCOPY     Oncologic history  patient is a 58-year-old female with no chronic medical problems who felt a mass in her left breast was sent for imaging which revealed an avascular mass measuring 3 x 3 mm in the left breast 1:00 this was biopsied and showed incidental ALH.  She proceeded to have excision of this area which showed no residual atypia and was referred here to consider prevention    Medically was at age 11 she had a hysterectomy at age 55 with heavy bleeding since then has had menopausal symptoms but no oophorectomy was done.  She started hormone therapy with an estrogen patch and discontinued with her recent biopsy  She is  2 para 2 first childbirth with a 27 she did not breast-feed significantly    Family history is positive for mother dying of colon cancer in her 60s father had colon cancer in his 80s prostate cancer and melanoma, maternal grandmother breast cancer in her 60s -she has not had genetic testing  and I ordered it for her    She stopped smoking 30 years ago she drinks 4-5 mixed drinks a week    She has never had a DVT MI or stroke    Her most recent bone density showed osteopenia      Current Outpatient Medications on File Prior to Visit   Medication Sig Dispense Refill    B Complex Vitamins (VITAMIN-B COMPLEX PO) Take  by mouth.      Calcium Magnesium Zinc 333-133-5 MG tablet Take 1 tablet by mouth Daily.      cetirizine (zyrTEC) 10 MG tablet Take 1 tablet by mouth Daily.      lansoprazole (PREVACID) 30 MG capsule Take 1 capsule by mouth 2 (Two) Times a Day Before Meals. 180 capsule 3    Omega-3 Fatty Acids (FISH OIL) 1000 MG capsule capsule Take 1 capsule by mouth Daily With Breakfast.      raloxifene (EVISTA) 60 MG tablet Take 1 tablet by mouth Daily.      sertraline (ZOLOFT) 50 MG tablet Take 2 tablets by mouth Daily.      TURMERIC PO Take 2 capsules by mouth Daily.      Vitamin D, Cholecalciferol, 25 MCG (1000 UT) capsule Take 1 capsule by mouth Daily.       No current facility-administered medications on file prior to visit.        ALLERGIES:  No Known Allergies     Social History     Socioeconomic History    Marital status:     Number of children: 2   Tobacco Use    Smoking status: Former     Current packs/day: 0.00     Types: Cigarettes     Quit date:      Years since quittin.1    Smokeless tobacco: Never    Tobacco comments:     Smoked 1/2 pack week  for 15 years   Vaping Use    Vaping status: Never Used   Substance and Sexual Activity    Alcohol use: Yes     Alcohol/week: 6.0 standard drinks of alcohol     Types: 3 Glasses of wine, 3 Shots of liquor per week    Drug use: Never    Sexual activity: Yes     Partners: Male     Birth control/protection: Post-menopausal, Hysterectomy        Family History   Problem Relation Age of Onset    Liver cancer Mother     Colon cancer Mother     Colon polyps Mother     Alcohol abuse Mother     Arthritis Mother     Cancer Mother     Inflammatory  "bowel disease Mother     Irritable bowel syndrome Mother     Prostate cancer Father     Colon cancer Father     Heart attack Father     Melanoma Father     Cancer Father     Colon polyps Father     Heart disease Father     Hypertension Father     Stroke Father     Cancer Sister     Kidney cancer Brother     No Known Problems Maternal Aunt     Breast cancer Maternal Grandmother     Malig Hyperthermia Neg Hx         Review of Systems   Constitutional:  Positive for diaphoresis.   Endocrine: Positive for heat intolerance.   All other systems reviewed and are negative.   hot flashes    Objective     Vitals:    02/17/25 1029   BP: 116/81   Pulse: 68   Resp: 16   Temp: 97.4 °F (36.3 °C)   TempSrc: Oral   SpO2: 97%   Weight: 69.2 kg (152 lb 8 oz)   Height: 157 cm (61.81\")   PainSc: 0-No pain         2/17/2025    10:31 AM   Current Status   ECOG score 0       Physical Exam        CONSTITUTIONAL:  Vital signs reviewed.  No distress, looks comfortable.  EYES:  Conjunctivae and lids unremarkable.  PERRLA  EARS,NOSE,MOUTH,THROAT:  Ears and nose appear unremarkable.  Lips, teeth, gums appear unremarkable.  RESPIRATORY:  Normal respiratory effort.  Lungs clear to auscultation bilaterally.  BREASTS: Breasts are benign with a well-healed scar at the areolar margin on the left  CARDIOVASCULAR:  Normal S1, S2.  No murmurs rubs or gallops.  No significant lower extremity edema.  GASTROINTESTINAL: Abdomen appears unremarkable.  Nontender.  No hepatomegaly.  No splenomegaly.  LYMPHATIC:  No cervical, supraclavicular, axillary lymphadenopathy.  SKIN:  Warm.  No rashes.  PSYCHIATRIC:  Normal judgment and insight.  Normal mood and affect.      RECENT LABS:  Hematology WBC   Date Value Ref Range Status   02/17/2025 4.08 3.40 - 10.80 10*3/mm3 Final   04/26/2022 4.0 3.4 - 10.8 x10E3/uL Final     RBC   Date Value Ref Range Status   02/17/2025 4.24 3.77 - 5.28 10*6/mm3 Final   04/26/2022 4.07 3.77 - 5.28 x10E6/uL Final     Hemoglobin   Date " Value Ref Range Status   02/17/2025 14.1 12.0 - 15.9 g/dL Final     Hematocrit   Date Value Ref Range Status   02/17/2025 42.7 34.0 - 46.6 % Final     Platelets   Date Value Ref Range Status   02/17/2025 196 140 - 450 10*3/mm3 Final            inal Diagnosis 2/29/24   1.  Breast, left 1 o'clock position, core biopsy:               A. Incidental focal atypical lobular hyperplasia  B. Duct ectasia and portions of cyst wall with columnar cell change.   Electronically signed by Fiona Mcqueen MD     Final Diagnosis 5/24/24  1. Left breast, oriented excisional biopsy/lumpectomy:  A. Sclerosing adenosis, fibrocystic change with clustered ductal microcysts, focal duct ectasia and focal  fibroadenomatous hyperplasia.  B. No atypical hyperplasia, carcinoma in-situ nor invasive carcinoma identified.  C. Biopsy site changes are identified, and metallic clip retrieved.  D. Rare microcalcification identified within benign breast tissue.  E. Surgical margins are viable and negative for neoplasm/malignancy.  Electronically signed by Suzi    Assessment & Plan     1.ALH incidental left breast-patient did palpate a lump in this area-lifetime risk of 35% by the Tyrer-Cuzick model 15% 10-year  Patient taking Evista with mild increase in hot flashes  Tolerating Evista fairly well as of 2/25    2.  Family history of colon breast cancer genetic testing ordered-US in [facility] 3 discussed with Dr. Lauren Brylee we will discuss interval of follow-up    3.  Menopausal with hot flashes-wants to hold off on adding Veozah at this time    4.  Osteopenia-calcium and vitamin D added    Plan  1, continue Evista  2.  Veozah for hot flashes do not improve  3.  Mammogram next month and she will discuss with Dr. Dooley whether or not to add MRIs

## 2025-02-18 ENCOUNTER — PRIOR AUTHORIZATION (OUTPATIENT)
Dept: ONCOLOGY | Facility: HOSPITAL | Age: 60
End: 2025-02-18
Payer: COMMERCIAL

## 2025-02-18 NOTE — TELEPHONE ENCOUNTER
Outcome  Approved today by [employer] [facility] 2017  Your PA request has been approved. Additional information will be provided in the approval communication. (Message 1147)  Authorization Expiration Date: 2/17/2026  Drug  Veozah 45MG tablets  ePA cloud logo  Form  [employer] Electronic PA Form (2017 NCP[employer])  Original Claim Info  75

## 2025-02-21 ENCOUNTER — TELEPHONE (OUTPATIENT)
Dept: GASTROENTEROLOGY | Facility: CLINIC | Age: 60
End: 2025-02-21
Payer: COMMERCIAL

## 2025-02-21 NOTE — TELEPHONE ENCOUNTER
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

## 2025-02-21 NOTE — TELEPHONE ENCOUNTER
Called pt and advised of Dr Duque's note.   Verb understanding.     C/s placed in recall and hm for 12/06/2026.

## 2025-03-21 ENCOUNTER — OFFICE VISIT (OUTPATIENT)
Dept: SURGERY | Facility: CLINIC | Age: 60
End: 2025-03-21
Payer: COMMERCIAL

## 2025-03-21 VITALS
BODY MASS INDEX: 27.6 KG/M2 | DIASTOLIC BLOOD PRESSURE: 86 MMHG | WEIGHT: 150 LBS | HEART RATE: 70 BPM | SYSTOLIC BLOOD PRESSURE: 110 MMHG | HEIGHT: 62 IN | OXYGEN SATURATION: 98 %

## 2025-03-21 DIAGNOSIS — Z91.89 AT HIGH RISK FOR BREAST CANCER: Primary | ICD-10-CM

## 2025-03-21 NOTE — PROGRESS NOTES
GENERAL SURGERY BENIGN BREAST HISTORY AND PHYSICAL      SUMMARY:  Itzel Moses is a 58 y.o. lady with:  A history of left breast atypical lobular hyperplasia.  -S/p left breast wire localized excisional biopsy May 2024.  -Annual mammogram due February 2025. Due now, orders placed.   -Annual MRI due August 2025. Orders placed.      High risk screening:   -Solo Warner lifetime breast cancer risk: 23%. This patient does qualify for high risk screening.  -This patient does qualify be referred to medical oncology for prophylactic endocrine therapy due to high risk for breast cancer.     Breast pain:  -Discussed vitamin E supplementation.     Family history of colon cancer:   -Last colonoscopy 2021. Due 2026.      Referring Provider: No ref. provider found     Chief complaint: breast mass     HPI: Ms. Itzel Moses is seen at the request of No ref. provider found. The patient is a 58 y.o. woman being seen for a new diagnosis of left breast atypical lobular hyperplasia.       She had a couple week of left breast pain. She is now having right breast pain as well.      She had a left excisional biopsy 20 years ago that was benign.      This was initially detected as an imaging abnormality on routine screening. Her work-up is detailed in the breast history section below. She has had regular annual mammogram. She denies any prior history of abnormal mammograms or breast biopsies. She denies any breast lumps, pain, skin changes, or nipple discharge.     She has a family history of breast cancer in her maternal grandmother (age 60s). She denies any family history of ovarian cancer.     6/12/2024 she presents today for follow-up.  She is done well since surgery with no concerns or complaints.    3/21/2025 She presents today for follow up. She is overall doing well. She has no concerns with her breast exam. She is following with oncology. She is on evista. She is having no side effects.  She is up to date on PCP  and gyn exams.       TIMELINE OF WORKUP:  2/9/2023 bilateral screening mammogram:  There are scattered areas of fibroglandular density.  There is no mammographic evidence of malignancy.  BI-RADS Category 1     2/15/2024 bilateral diagnostic mammogram with left breast ultrasound:  There are scattered areas of fibroglandular density.  There is no radiographic abnormality in the region of the pain left breast at 1:00.  On ultrasound there is an oval parallel hypoechoic vascular mass with indistinct margins measuring 3 x 2 x 3 mm in the left breast at 1:00 4 cm from the nipple.  This is an incidental finding but located the area of concern.  Suspicious.  Ultrasound-guided biopsy is recommended.  BI-RADS Category 4B     2/29/2024 left breast ultrasound-guided biopsy:  The left breast at 1:00 was imaged and the mass was localized.  8 cores were obtained.  A mini cork tissue marker was placed.  Clip was in the expected position.  Pathology returned as focal ALH which is high risk and concordant.     2/29/2024 Pathology:   Final Diagnosis   1.  Breast, left 1 o'clock position, core biopsy:               A. Incidental focal atypical lobular hyperplasia  B. Duct ectasia and portions of cyst wall with columnar cell change      4/13/2024 Bilateral Breast MRI   IMPRESSION:  There are no findings suspicious for malignancy in either breast. Routine followup imaging is recommended.    BI-RADS category 1: Negative.     5/24/2024 Left breast wire localized excisional biopsy   Final Diagnosis   1.  Left breast, oriented excisional biopsy/lumpectomy:               A.  Sclerosing adenosis, fibrocystic change with clustered ductal microcysts, focal duct ectasia and focal fibroadenomatous hyperplasia.               B.  No atypical hyperplasia, carcinoma in-situ nor invasive carcinoma identified.               C.  Biopsy site changes are identified, and metallic clip retrieved.               D.  Rare microcalcification identified within  benign breast tissue.               E.  Surgical margins are viable and negative for neoplasm/malignancy.     MEDICAL HISTORY:   Gynecologic History:   . P:2 AB: 0  Age at first childbirth: 24  Lactation/How long: Yes  Age at menarche: 10  Age at menopause: Hysterectomy  Total years of oral contraceptive use: 15 years previously  Total years of hormone replacement therapy: 3 years, still currently on     Past Medical History:   GERD     Past Surgical History:    Hysterectomy   Carpal tunnel      Family History:    As above     Social History:   Denies tobacco use, quit over 10 years ago  Occasional alcohol use     Allergies:   No known allergies     Medications:   Fezolinetant  Raloxifene  Lansoprazole  Sertraline  Cetirizine  Turmeric  Fish oil  Calcium magnesium zinc  Vitamin D  B complex vitamins     Labs:    Labs from 2025 reviewed     ROS:   Influenza-like illness: no fever, no  cough, no  sore throat, no  body aches, no loss of sense of taste or smell, no known exposure to person with Covid-19.  Constitutional: Negative for fevers or chills  HENT: Negative for hearing loss or runny nose  Eyes: Negative for vision changes or scleral icterus  Respiratory: Negative for cough or shortness of breath  Cardiovascular: Negative for chest pain or heart palpitations  Gastrointestinal: Negative for abdominal pain, nausea, vomiting, constipation, melena, or hematochezia  Genitourinary: Negative for hematuria or dysuria  Musculoskeletal: Negative for joint swelling or gait instability  Neurologic: Negative for tremors or seizures  Psychiatric: Negative for suicidal ideations or depression  All other systems reviewed and negative     PHYSICAL EXAM:   Constitutional: Well-developed well-nourished, no acute distress  Eyes: Conjunctiva normal, sclera nonicteric  ENMT: Hearing grossly normal, oral mucosa moist  Neck: Supple, no palpable mass, trachea midline  Respiratory: Clear to auscultation, normal inspiratory  effort  Cardiovascular: Regular rate, no murmur, no peripheral edema, no jugular venous distention  Breast: symmetric  Right: No visible abnormalities on inspection while seated, with arms raised or hands on hips. No masses, skin changes, or nipple abnormalities.   Left: No visible abnormalities on inspection while seated, with arms raised or hands on hips. No masses, skin changes, or nipple abnormalities. Left breast incision clean and dry with no erythema or drainage, no hematoma or seroma.  No clinical chest wall involvement.  Gastrointestinal: Soft, nontender  Lymphatics (palpable nodes): No cervical, supraclavicular or axillary lymphadenopathy  Skin:  Warm, dry, no rash on visualized skin surfaces  Musculoskeletal: Symmetric strength, normal gait  Psychiatric: Alert and oriented ×3, normal affect      TREY GAGE M.D.  General and Endoscopic Surgery  [facility]     [address] [place] [address], [address]  [place], [place], [address]  P: [phone]  F: [phone]

## 2025-06-30 RX ORDER — RALOXIFENE HYDROCHLORIDE 60 MG/1
60 TABLET, FILM COATED ORAL DAILY
Qty: 90 TABLET | Refills: 3 | Status: SHIPPED | OUTPATIENT
Start: 2025-06-30

## 2025-07-21 ENCOUNTER — TELEPHONE (OUTPATIENT)
Dept: SURGERY | Facility: CLINIC | Age: 60
End: 2025-07-21

## 2025-08-18 ENCOUNTER — LAB (OUTPATIENT)
Dept: OTHER | Facility: HOSPITAL | Age: 60
End: 2025-08-18
Payer: COMMERCIAL

## 2025-08-18 ENCOUNTER — OFFICE VISIT (OUTPATIENT)
Dept: ONCOLOGY | Facility: CLINIC | Age: 60
End: 2025-08-18
Payer: COMMERCIAL

## 2025-08-18 VITALS
WEIGHT: 150.1 LBS | HEIGHT: 62 IN | DIASTOLIC BLOOD PRESSURE: 76 MMHG | BODY MASS INDEX: 27.62 KG/M2 | OXYGEN SATURATION: 95 % | HEART RATE: 76 BPM | TEMPERATURE: 97.1 F | SYSTOLIC BLOOD PRESSURE: 111 MMHG

## 2025-08-18 DIAGNOSIS — N60.99 ATYPICAL LOBULAR HYPERPLASIA (ALH) OF BREAST: ICD-10-CM

## 2025-08-18 DIAGNOSIS — N60.99 ATYPICAL LOBULAR HYPERPLASIA (ALH) OF BREAST: Primary | ICD-10-CM

## 2025-08-18 LAB
ALBUMIN SERPL-MCNC: 4.4 G/DL (ref 3.5–5.2)
ALBUMIN/GLOB SERPL: 1.4 G/DL
ALP SERPL-CCNC: 57 U/L (ref 39–117)
ALT SERPL W P-5'-P-CCNC: 18 U/L (ref 1–33)
ANION GAP SERPL CALCULATED.3IONS-SCNC: 9.9 MMOL/L (ref 5–15)
AST SERPL-CCNC: 19 U/L (ref 1–32)
BASOPHILS # BLD AUTO: 0.06 10*3/MM3 (ref 0–0.2)
BASOPHILS NFR BLD AUTO: 1 % (ref 0–1.5)
BILIRUB SERPL-MCNC: 0.5 MG/DL (ref 0–1.2)
BUN SERPL-MCNC: 15.8 MG/DL (ref 8–23)
BUN/CREAT SERPL: 19.5 (ref 7–25)
CALCIUM SPEC-SCNC: 9.8 MG/DL (ref 8.6–10.5)
CHLORIDE SERPL-SCNC: 104 MMOL/L (ref 98–107)
CO2 SERPL-SCNC: 28.1 MMOL/L (ref 22–29)
CREAT SERPL-MCNC: 0.81 MG/DL (ref 0.57–1)
DEPRECATED RDW RBC AUTO: 43.8 FL (ref 37–54)
EGFRCR SERPLBLD CKD-EPI 2021: 83.2 ML/MIN/1.73
EOSINOPHIL # BLD AUTO: 0.3 10*3/MM3 (ref 0–0.4)
EOSINOPHIL NFR BLD AUTO: 5 % (ref 0.3–6.2)
ERYTHROCYTE [DISTWIDTH] IN BLOOD BY AUTOMATED COUNT: 11.9 % (ref 12.3–15.4)
GLOBULIN UR ELPH-MCNC: 3.1 GM/DL
GLUCOSE SERPL-MCNC: 73 MG/DL (ref 65–99)
HCT VFR BLD AUTO: 40 % (ref 34–46.6)
HGB BLD-MCNC: 13.3 G/DL (ref 12–15.9)
IMM GRANULOCYTES # BLD AUTO: 0.01 10*3/MM3 (ref 0–0.05)
IMM GRANULOCYTES NFR BLD AUTO: 0.2 % (ref 0–0.5)
LYMPHOCYTES # BLD AUTO: 1.83 10*3/MM3 (ref 0.7–3.1)
LYMPHOCYTES NFR BLD AUTO: 30.3 % (ref 19.6–45.3)
MCH RBC QN AUTO: 33.2 PG (ref 26.6–33)
MCHC RBC AUTO-ENTMCNC: 33.3 G/DL (ref 31.5–35.7)
MCV RBC AUTO: 99.8 FL (ref 79–97)
MONOCYTES # BLD AUTO: 0.41 10*3/MM3 (ref 0.1–0.9)
MONOCYTES NFR BLD AUTO: 6.8 % (ref 5–12)
NEUTROPHILS NFR BLD AUTO: 3.42 10*3/MM3 (ref 1.7–7)
NEUTROPHILS NFR BLD AUTO: 56.7 % (ref 42.7–76)
NRBC BLD AUTO-RTO: 0 /100 WBC (ref 0–0.2)
PLATELET # BLD AUTO: 194 10*3/MM3 (ref 140–450)
PMV BLD AUTO: 12 FL (ref 6–12)
POTASSIUM SERPL-SCNC: 4 MMOL/L (ref 3.5–5.2)
PROT SERPL-MCNC: 7.5 G/DL (ref 6–8.5)
RBC # BLD AUTO: 4.01 10*6/MM3 (ref 3.77–5.28)
SODIUM SERPL-SCNC: 142 MMOL/L (ref 136–145)
WBC NRBC COR # BLD AUTO: 6.03 10*3/MM3 (ref 3.4–10.8)

## 2025-08-18 PROCEDURE — 36415 COLL VENOUS BLD VENIPUNCTURE: CPT

## 2025-08-18 PROCEDURE — 85025 COMPLETE CBC W/AUTO DIFF WBC: CPT | Performed by: INTERNAL MEDICINE

## 2025-08-18 PROCEDURE — 80053 COMPREHEN METABOLIC PANEL: CPT | Performed by: INTERNAL MEDICINE

## (undated) DEVICE — LN SMPL CO2 SHTRM SD STREAM W/M LUER

## (undated) DEVICE — PK PROC MINOR TOWER 40

## (undated) DEVICE — ADHS SKIN SURG TISS VISC PREMIERPRO EXOFIN HI/VISC FAST/DRY

## (undated) DEVICE — KT ORCA ORCAPOD DISP STRL

## (undated) DEVICE — SUT SILK 2/0 FS BLK 18IN 685G

## (undated) DEVICE — SPNG LAP 18X18IN LF STRL PK/5

## (undated) DEVICE — MSK ENDO PORT O2 POM ELITE CURAPLEX A/

## (undated) DEVICE — APPL CHLORAPREP HI/LITE 26ML ORNG

## (undated) DEVICE — PENCL SMOKE/EVAC MEGADYNE TELESCP 10FT

## (undated) DEVICE — ADAPT CLN BIOGUARD AIR/H2O DISP

## (undated) DEVICE — SENSR O2 OXIMAX FNGR A/ 18IN NONSTR

## (undated) DEVICE — ELECTRD BLD EZ CLN MOD 2.5IN

## (undated) DEVICE — SUT VIC 3/0 SH 27IN J416H

## (undated) DEVICE — TUBING, SUCTION, 1/4" X 10', STRAIGHT: Brand: MEDLINE

## (undated) DEVICE — SUT MNCRYL PLS ANTIB UD 4/0 PS2 18IN

## (undated) DEVICE — GLV SURG BIOGEL LTX PF 6 1/2

## (undated) DEVICE — TRAP FLD MINIVAC MEGADYNE 100ML

## (undated) DEVICE — BITEBLOCK OMNI BLOC

## (undated) DEVICE — PATIENT RETURN ELECTRODE, SINGLE-USE, CONTACT QUALITY MONITORING, ADULT, WITH 9FT CORD, FOR PATIENTS WEIGING OVER 33LBS. (15KG): Brand: MEGADYNE

## (undated) DEVICE — FRCP BX RADJAW4 NDL 2.8 240CM LG OG BX40

## (undated) DEVICE — HYPODERMIC SAFETY NEEDLE: Brand: MONOJECT